# Patient Record
Sex: FEMALE | Race: BLACK OR AFRICAN AMERICAN | NOT HISPANIC OR LATINO | Employment: FULL TIME | URBAN - METROPOLITAN AREA
[De-identification: names, ages, dates, MRNs, and addresses within clinical notes are randomized per-mention and may not be internally consistent; named-entity substitution may affect disease eponyms.]

---

## 2017-04-04 ENCOUNTER — OFFICE VISIT (OUTPATIENT)
Dept: NEUROLOGY | Facility: CLINIC | Age: 57
End: 2017-04-04
Payer: COMMERCIAL

## 2017-04-04 VITALS
DIASTOLIC BLOOD PRESSURE: 85 MMHG | SYSTOLIC BLOOD PRESSURE: 128 MMHG | BODY MASS INDEX: 34.77 KG/M2 | HEART RATE: 87 BPM | WEIGHT: 188.94 LBS | HEIGHT: 62 IN

## 2017-04-04 DIAGNOSIS — G43.909 MIGRAINE WITHOUT STATUS MIGRAINOSUS, NOT INTRACTABLE, UNSPECIFIED MIGRAINE TYPE: Primary | ICD-10-CM

## 2017-04-04 DIAGNOSIS — G47.00 INSOMNIA, UNSPECIFIED TYPE: ICD-10-CM

## 2017-04-04 PROCEDURE — 1160F RVW MEDS BY RX/DR IN RCRD: CPT | Mod: S$GLB,,, | Performed by: PSYCHIATRY & NEUROLOGY

## 2017-04-04 PROCEDURE — 99999 PR PBB SHADOW E&M-EST. PATIENT-LVL III: CPT | Mod: PBBFAC,,, | Performed by: PSYCHIATRY & NEUROLOGY

## 2017-04-04 PROCEDURE — 99214 OFFICE O/P EST MOD 30 MIN: CPT | Mod: S$GLB,,, | Performed by: PSYCHIATRY & NEUROLOGY

## 2017-04-04 RX ORDER — ZOLPIDEM TARTRATE 12.5 MG/1
12.5 TABLET, FILM COATED, EXTENDED RELEASE ORAL NIGHTLY PRN
Qty: 30 TABLET | Refills: 5 | Status: SHIPPED | OUTPATIENT
Start: 2017-04-04 | End: 2017-11-01 | Stop reason: SDUPTHER

## 2017-04-04 RX ORDER — FLUTICASONE PROPIONATE 50 MCG
SPRAY, SUSPENSION (ML) NASAL
COMMUNITY
Start: 2017-01-04

## 2017-04-04 RX ORDER — VENLAFAXINE 75 MG/1
75 TABLET ORAL DAILY
Qty: 30 TABLET | Refills: 5 | Status: SHIPPED | OUTPATIENT
Start: 2017-04-04 | End: 2017-09-13

## 2017-04-04 RX ORDER — TOPIRAMATE 50 MG/1
50 TABLET, FILM COATED ORAL 2 TIMES DAILY
Qty: 60 TABLET | Refills: 5 | Status: SHIPPED | OUTPATIENT
Start: 2017-04-04 | End: 2017-09-13

## 2017-04-04 NOTE — PROGRESS NOTES
Subjective:       Patient ID: Zeina Estrella is a 56 y.o. female.    Chief Complaint:  Headache      History of Present Illness  HPI   This is a 56-year-old female who is being followed by me with complaints of intermittent headaches.  She has had a history of these headaches for many years, diagnosed as migraines.  She was last seen by me 6 months ago.  Stress was a major precipitant and she does report that ever since she stopped working in July 2008, the headache frequency and intensity had significantly improved.  She was working as a [employer] .  She took some time off and subsequently started working as a .  However she does report that his job is stressful which can be a trigger for her headaches.    The headaches are usually frontotemporal occasionally throbbing and she has been using OTC Excedrin migraine with good relief.  She is presently on topiramate 50 mg once a day and Effexor 50 mg at bedtime daily.  She is also on Paxil 10 mg daily for menopausal symptoms.  She has been sleeping well with Ambien CR.       Review of Systems  Review of Systems   Constitutional: Negative.    HENT: Negative for hearing loss.    Eyes: Negative.  Negative for visual disturbance.   Respiratory: Negative.  Negative for shortness of breath.    Cardiovascular: Negative.  Negative for chest pain and palpitations.   Gastrointestinal: Negative.    Endocrine: Negative.    Genitourinary: Negative.    Musculoskeletal: Negative.  Negative for back pain, gait problem and neck pain.   Skin: Negative.    Allergic/Immunologic: Negative.    Neurological: Positive for dizziness and headaches. Negative for tremors, seizures, syncope, speech difficulty, weakness and numbness.   Hematological: Negative.    Psychiatric/Behavioral: Positive for sleep disturbance. Negative for confusion and decreased concentration.       Objective:      Neurologic Exam     Mental Status   Oriented to  person, place, and time.   Registration: recalls 3 of 3 objects. Recall at 5 minutes: recalls 3 of 3 objects. Follows 3 step commands.   Attention: normal. Concentration: normal.   Speech: speech is normal   Level of consciousness: alert  Knowledge: good. Able to perform simple calculations.   Able to name object. Able to read. Able to repeat. Able to write. Normal comprehension.     Cranial Nerves   Cranial nerves II through XII intact.     Motor Exam   Muscle bulk: normal  Overall muscle tone: normal    Strength   Strength 5/5 throughout.     Sensory Exam   Light touch normal.   Proprioception normal.   Pinprick normal.     Gait, Coordination, and Reflexes     Gait  Gait: normal    Coordination   Romberg: negative  Finger to nose coordination: normal    Tremor   Resting tremor: absent  Intention tremor: absent  Action tremor: absent    Reflexes   Right brachioradialis: 2+  Left brachioradialis: 2+  Right biceps: 2+  Left biceps: 2+  Right triceps: 2+  Left triceps: 2+  Right patellar: 2+  Left patellar: 2+  Right achilles: 2+  Left achilles: 2+  Right plantar: normal  Left plantar: normal      Physical Exam   Constitutional: She is oriented to person, place, and time. She appears well-developed and well-nourished.   HENT:   Head: Normocephalic and atraumatic.   Neck: Normal range of motion. Neck supple. Carotid bruit is not present.   Neurological: She is oriented to person, place, and time. She has normal strength. She has a normal Finger-Nose-Finger Test and a normal Romberg Test. Gait normal.   Reflex Scores:       Tricep reflexes are 2+ on the right side and 2+ on the left side.       Bicep reflexes are 2+ on the right side and 2+ on the left side.       Brachioradialis reflexes are 2+ on the right side and 2+ on the left side.       Patellar reflexes are 2+ on the right side and 2+ on the left side.       Achilles reflexes are 2+ on the right side and 2+ on the left side.  Psychiatric: Her speech is normal.    Vitals reviewed.        Assessment:        1. Migraine without status migrainosus, not intractable, unspecified migraine type    2. Insomnia, unspecified type             Plan:       Discussed with patient.  Continue topiramate 50 mg daily.  Continue Ambien CR for sleep.  Effexor increased to 75 mg daily for the headaches that appear to be related to recent stressors. Discussed stress reduction techniques.  Follow-up in 6 months if stable.

## 2017-04-04 NOTE — PATIENT INSTRUCTIONS
Discussed with patient.  Continue topiramate 50 mg daily.  Continue Ambien CR for sleep.  Effexor increased to 75 mg daily for the headaches that appear to be related to recent stressors. Discussed stress reduction techniques.

## 2017-04-04 NOTE — MR AVS SNAPSHOT
[facility]  [address]  [place] [address]  Phone: [phone]  Fax: [phone]                  Zeina Estrella   2017 9:00 AM   Office Visit    Description:  Female : 1960   Provider:  Natalio Chacon MD   Department:  [facility]           Reason for Visit     Headache           Diagnoses this Visit        Comments    Migraine without status migrainosus, not intractable, unspecified migraine type    -  Primary     Insomnia, unspecified type                To Do List           Future Appointments        Provider Department Dept Phone    10/4/2017 9:00 AM Natalio Chacon MD [facility] [phone]      Goals (5 Years of Data)     None      Follow-Up and Disposition     Return in about 6 months (around 10/4/2017).       These Medications        Disp Refills Start End    venlafaxine (EFFEXOR) 75 MG tablet 30 tablet 5 2017 10/1/2017    Take 1 tablet (75 mg total) by mouth once daily. - Oral    [facility] [address] [facility] Ph #: [phone]       zolpidem (AMBIEN CR) 12.5 MG CR tablet 30 tablet 5 2017 10/1/2017    Take 1 tablet (12.5 mg total) by mouth nightly as needed for Insomnia. - Oral    [facility] [address] [facility] Ph #: [phone]       topiramate (TOPAMAX) 50 MG tablet 60 tablet 5 2017 10/1/2017    Take 1 tablet (50 mg total) by mouth 2 (two) times daily. - Oral    [facility] [address] [facility] Ph #: [phone]         [facility]     [place] On Call Nurse Care Line -  Assistance  Unless otherwise directed by your provider, please contact [facility], our nurse care line that is available for 24 assistance.     Registered nurses in the [facility] provide: appointment scheduling, clinical advisement, health education, and other advisory services.  Call:  "[phone] (toll free)               Medications           Message regarding Medications     Verify the changes and/or additions to your medication regime listed below are the same as discussed with your clinician today.  If any of these changes or additions are incorrect, please notify your healthcare provider.        CHANGE how you are taking these medications     Start Taking Instead of    venlafaxine (EFFEXOR) 75 MG tablet venlafaxine (EFFEXOR) 50 MG Tab    Dosage:  Take 1 tablet (75 mg total) by mouth once daily. Dosage:  Take 1 tablet (50 mg total) by mouth once daily.    Reason for Change:  Reorder     zolpidem (AMBIEN CR) 12.5 MG CR tablet zolpidem (AMBIEN CR) 12.5 MG CR tablet    Dosage:  Take 1 tablet (12.5 mg total) by mouth nightly as needed for Insomnia.     Reason for Change:  Reorder            Verify that the below list of medications is an accurate representation of the medications you are currently taking.  If none reported, the list may be blank. If incorrect, please contact your healthcare provider. Carry this list with you in case of emergency.           Current Medications     fluconazole (DIFLUCAN) 150 MG Tab     fluticasone (FLONASE) 50 mcg/actuation nasal spray     levothyroxine (SYNTHROID) 50 MCG tablet     paroxetine (PAXIL) 10 MG tablet Take 10 mg by mouth every morning.    terconazole (TERAZOL 3) 0.8 % vaginal cream     topiramate (TOPAMAX) 50 MG tablet Take 1 tablet (50 mg total) by mouth 2 (two) times daily.    venlafaxine (EFFEXOR) 75 MG tablet Take 1 tablet (75 mg total) by mouth once daily.    VITAMIN D2 50,000 unit capsule     zolpidem (AMBIEN CR) 12.5 MG CR tablet Take 1 tablet (12.5 mg total) by mouth nightly as needed for Insomnia.           Clinical Reference Information           Your Vitals Were     BP Pulse Height Weight BMI    128/85 (BP Location: Right arm, Patient Position: Sitting, BP Method: Automatic) 87 5' 2" (1.575 m) 85.7 kg (188 lb 15 oz) 34.56 kg/m2      Blood " Pressure          Most Recent Value    BP  128/85      Allergies as of 4/4/2017     Erythromycin      Immunizations Administered on Date of Encounter - 4/4/2017     None      [facility] Sign-Up     Activating your [facility] account is as easy as 1-2-3!     1) Visit [email or web address], select Sign Up Now, enter this activation code and your date of birth, then select Next.  [email or web address]  Expires: 5/19/2017 10:15 AM      2) Create a username and password to use when you visit [facility] in the future and select a security question in case you lose your password and select Next.    3) Enter your e-mail address and click Sign Up!    Additional Information  If you have questions, please e-mail [email or web address] or call [phone] to talk to our [facility] staff. Remember, [facility] is NOT to be used for urgent needs. For medical emergencies, dial 911.         Instructions    Discussed with patient.  Continue topiramate 50 mg daily.  Continue Ambien CR for sleep.  Effexor increased to 75 mg daily for the headaches that appear to be related to recent stressors. Discussed stress reduction techniques.       Language Assistance Services     ATTENTION: Language assistance services are available, free of charge. Please call [phone].      ATENCIÓN: Si habla español, tiene a adams disposición servicios gratuitos de asistencia lingüística. Llame al [phone].     [place] Ý: N?u b?n nói Ti?ng Vi?t, có các d?ch v? h? tr? ngôn ng? mi?n phí dành cho b?n. G?i s? [phone].         [facility] complies with applicable Federal civil rights laws and does not discriminate on the basis of race, color, national origin, age, disability, or sex.

## 2017-09-07 ENCOUNTER — TELEPHONE (OUTPATIENT)
Dept: NEUROLOGY | Facility: CLINIC | Age: 57
End: 2017-09-07

## 2017-09-07 NOTE — TELEPHONE ENCOUNTER
----- Message from Lesley Johnson sent at 9/7/2017 10:04 AM CDT -----  Contact: Patient [phone]  Patient is calling to request an order for and MRI, patient states she has been having a lot of head pain. Please call

## 2017-09-12 ENCOUNTER — TELEPHONE (OUTPATIENT)
Dept: NEUROLOGY | Facility: CLINIC | Age: 57
End: 2017-09-12

## 2017-09-12 NOTE — TELEPHONE ENCOUNTER
----- Message from Lucina Mcnamara sent at 9/12/2017  9:06 AM CDT -----  Contact: pt  _x  1st Request  _  2nd Request  _  3rd Request      Who:pt     Why: returning call back    What Number to Call Back: [phone]    When to Expect a call back: (Before the end of the day)   -- if call after 3:00 call back will be tomorrow.

## 2017-09-12 NOTE — TELEPHONE ENCOUNTER
Spoke to patient who's appointment from today has been rescheduled to tomorrow due to deposition  has to attend.

## 2017-09-13 ENCOUNTER — OFFICE VISIT (OUTPATIENT)
Dept: NEUROLOGY | Facility: CLINIC | Age: 57
End: 2017-09-13
Payer: COMMERCIAL

## 2017-09-13 VITALS
HEIGHT: 62 IN | SYSTOLIC BLOOD PRESSURE: 124 MMHG | WEIGHT: 195.13 LBS | HEART RATE: 83 BPM | DIASTOLIC BLOOD PRESSURE: 88 MMHG | BODY MASS INDEX: 35.91 KG/M2

## 2017-09-13 DIAGNOSIS — G43.919 INTRACTABLE MIGRAINE WITHOUT STATUS MIGRAINOSUS, UNSPECIFIED MIGRAINE TYPE: Primary | ICD-10-CM

## 2017-09-13 DIAGNOSIS — G47.00 INSOMNIA, UNSPECIFIED TYPE: ICD-10-CM

## 2017-09-13 PROCEDURE — 99999 PR PBB SHADOW E&M-EST. PATIENT-LVL III: CPT | Mod: PBBFAC,,, | Performed by: PSYCHIATRY & NEUROLOGY

## 2017-09-13 PROCEDURE — 3008F BODY MASS INDEX DOCD: CPT | Mod: S$GLB,,, | Performed by: PSYCHIATRY & NEUROLOGY

## 2017-09-13 PROCEDURE — 99214 OFFICE O/P EST MOD 30 MIN: CPT | Mod: S$GLB,,, | Performed by: PSYCHIATRY & NEUROLOGY

## 2017-09-13 RX ORDER — VENLAFAXINE HYDROCHLORIDE 150 MG/1
150 CAPSULE, EXTENDED RELEASE ORAL DAILY
Qty: 30 CAPSULE | Refills: 5 | Status: SHIPPED | OUTPATIENT
Start: 2017-09-13 | End: 2018-03-12

## 2017-09-13 NOTE — PROGRESS NOTES
Subjective:       Patient ID: Zeina Estrella is a 57 y.o. female.    Chief Complaint:  Headache      History of Present Illness  HPI   This is a 57-year-old female who is being followed by me with complaints of intermittent headaches.  She has had a history of these headaches for many years, diagnosed as migraines.  She was last seen by me 6 months ago.  Stress was a major precipitant and she does report that ever since she stopped working in July 2008, the headache frequency and intensity had significantly improved.  She was working as a [employer] .  She took some time off and subsequently started working as a .  However she does report that his job is stressful which can be a trigger for her headaches.    The headaches are usually frontotemporal occasionally throbbing and she has been using OTC Excedrin migraine with good relief.  She does report that over the past couple months she has had almost daily headaches with fluctuating intensity primarily left-sided somewhat unusual.  They are not be responding to the OTC NSAIDs for headache medication.  She increased the topiramate 50 mg twice a day which has not helped.  She is also on Effexor 75 mg at bedtime daily.  She is also on Paxil 10 mg daily for menopausal symptoms.  She has been sleeping well with Ambien CR.       Review of Systems  Review of Systems   Constitutional: Negative.    HENT: Negative for hearing loss.    Eyes: Negative.  Negative for visual disturbance.   Respiratory: Negative.  Negative for shortness of breath.    Cardiovascular: Negative.  Negative for chest pain and palpitations.   Gastrointestinal: Negative.    Endocrine: Negative.    Genitourinary: Negative.    Musculoskeletal: Negative.  Negative for back pain, gait problem and neck pain.   Skin: Negative.    Allergic/Immunologic: Negative.    Neurological: Positive for dizziness and headaches. Negative for tremors, seizures,  syncope, speech difficulty, weakness and numbness.   Hematological: Negative.    Psychiatric/Behavioral: Positive for sleep disturbance. Negative for confusion and decreased concentration.       Objective:      Neurologic Exam     Mental Status   Oriented to person, place, and time.   Registration: recalls 3 of 3 objects. Recall at 5 minutes: recalls 3 of 3 objects. Follows 3 step commands.   Attention: normal. Concentration: normal.   Speech: speech is normal   Level of consciousness: alert  Knowledge: good. Able to perform simple calculations.   Able to name object. Able to read. Able to repeat. Able to write. Normal comprehension.     Cranial Nerves   Cranial nerves II through XII intact.     Motor Exam   Muscle bulk: normal  Overall muscle tone: normal    Strength   Strength 5/5 throughout.     Sensory Exam   Light touch normal.   Proprioception normal.   Pinprick normal.     Gait, Coordination, and Reflexes     Gait  Gait: normal    Coordination   Romberg: negative  Finger to nose coordination: normal    Tremor   Resting tremor: absent  Intention tremor: absent  Action tremor: absent    Reflexes   Right brachioradialis: 2+  Left brachioradialis: 2+  Right biceps: 2+  Left biceps: 2+  Right triceps: 2+  Left triceps: 2+  Right patellar: 2+  Left patellar: 2+  Right achilles: 2+  Left achilles: 2+  Right plantar: normal  Left plantar: normal      Physical Exam   Constitutional: She is oriented to person, place, and time. She appears well-developed and well-nourished.   HENT:   Head: Normocephalic and atraumatic.   Neck: Normal range of motion. Neck supple. Carotid bruit is not present.   Neurological: She is oriented to person, place, and time. She has normal strength. She has a normal Finger-Nose-Finger Test and a normal Romberg Test. Gait normal.   Reflex Scores:       Tricep reflexes are 2+ on the right side and 2+ on the left side.       Bicep reflexes are 2+ on the right side and 2+ on the left side.        Brachioradialis reflexes are 2+ on the right side and 2+ on the left side.       Patellar reflexes are 2+ on the right side and 2+ on the left side.       Achilles reflexes are 2+ on the right side and 2+ on the left side.  Psychiatric: Her speech is normal.   Vitals reviewed.        Assessment:        1. Intractable migraine without status migrainosus, unspecified migraine type    2. Insomnia, unspecified type             Plan:       Discussed with patient.  Because of the increase in her headaches and the unilateral nature of her headaches will get an MRI scan of the brain, noncontrast.  She has not had any neuroradiological studies in the past .  Gradually taper off the topiramate and increase the Effexor to 150 mg daily.  Continue Ambien CR for sleep.  Follow-up in 3 months if stable.

## 2017-09-13 NOTE — PATIENT INSTRUCTIONS
Discussed with patient.  Because of the increase in her headaches and the unilateral nature of her headaches will get an MRI scan of the brain, noncontrast.  She has not had any neuroradiological studies in the past .  Gradually taper off the topiramate and increase the Effexor to 150 mg daily.  Continue Ambien CR for sleep.

## 2017-09-20 ENCOUNTER — HOSPITAL ENCOUNTER (OUTPATIENT)
Dept: RADIOLOGY | Facility: OTHER | Age: 57
Discharge: HOME | End: 2017-09-20
Attending: PSYCHIATRY & NEUROLOGY
Payer: COMMERCIAL

## 2017-09-20 ENCOUNTER — PATIENT MESSAGE (OUTPATIENT)
Dept: NEUROLOGY | Facility: CLINIC | Age: 57
End: 2017-09-20

## 2017-09-20 DIAGNOSIS — G43.919 INTRACTABLE MIGRAINE WITHOUT STATUS MIGRAINOSUS, UNSPECIFIED MIGRAINE TYPE: ICD-10-CM

## 2017-09-20 PROCEDURE — 70551 MRI BRAIN STEM W/O DYE: CPT | Mod: TC

## 2017-09-20 PROCEDURE — 70551 MRI BRAIN STEM W/O DYE: CPT | Mod: 26,,, | Performed by: INTERNAL MEDICINE

## 2017-11-01 DIAGNOSIS — G47.00 INSOMNIA, UNSPECIFIED TYPE: ICD-10-CM

## 2017-11-01 RX ORDER — ZOLPIDEM TARTRATE 12.5 MG/1
12.5 TABLET, FILM COATED, EXTENDED RELEASE ORAL NIGHTLY PRN
Qty: 30 TABLET | Refills: 5 | Status: SHIPPED | OUTPATIENT
Start: 2017-11-01 | End: 2019-09-03

## 2017-11-13 ENCOUNTER — PATIENT MESSAGE (OUTPATIENT)
Dept: NEUROLOGY | Facility: CLINIC | Age: 57
End: 2017-11-13

## 2017-12-13 ENCOUNTER — OFFICE VISIT (OUTPATIENT)
Dept: NEUROLOGY | Facility: CLINIC | Age: 57
End: 2017-12-13
Payer: COMMERCIAL

## 2017-12-13 VITALS
SYSTOLIC BLOOD PRESSURE: 123 MMHG | DIASTOLIC BLOOD PRESSURE: 82 MMHG | WEIGHT: 196.63 LBS | HEART RATE: 99 BPM | HEIGHT: 62 IN | BODY MASS INDEX: 36.18 KG/M2

## 2017-12-13 DIAGNOSIS — G50.8 TRIGEMINAL NEURITIS: ICD-10-CM

## 2017-12-13 DIAGNOSIS — G43.009 MIGRAINE WITHOUT AURA AND WITHOUT STATUS MIGRAINOSUS, NOT INTRACTABLE: Primary | ICD-10-CM

## 2017-12-13 DIAGNOSIS — F41.8 DEPRESSION WITH ANXIETY: ICD-10-CM

## 2017-12-13 PROCEDURE — 99214 OFFICE O/P EST MOD 30 MIN: CPT | Mod: S$GLB,,, | Performed by: PSYCHIATRY & NEUROLOGY

## 2017-12-13 PROCEDURE — 99999 PR PBB SHADOW E&M-EST. PATIENT-LVL III: CPT | Mod: PBBFAC,,, | Performed by: PSYCHIATRY & NEUROLOGY

## 2017-12-13 RX ORDER — NAPROXEN 500 MG/1
TABLET ORAL
COMMUNITY
Start: 2017-10-03 | End: 2018-06-21

## 2017-12-13 NOTE — PROGRESS NOTES
Subjective:       Patient ID: Zeina Estrella is a 57 y.o. female.    Chief Complaint:  Headache      History of Present Illness  HPI   This is a 57-year-old female who is being followed by me with complaints of intermittent headaches.  She has had a history of these headaches for many years, diagnosed as migraines.  She was last seen by me 6 months ago.  Stress was a major precipitant and she does report that ever since she stopped working in July 2008, the headache frequency and intensity had significantly improved.  She was working as a [employer] .  She took some time off and subsequently started working as a .  However she does report that her job is stressful which can be a trigger for her headaches.  An MRI scan of the brain done was normal.    The headaches are usually frontotemporal occasionally throbbing and she has been using OTC Excedrin migraine with good relief.  She does report that over the past couple months she has had almost daily headaches with fluctuating intensity primarily left-sided somewhat unusual.  She is presently off the Topamax.  The Effexor dosage was increased to 150 mg at bedtime which has helped the intensity of the headache however she does have occasional left-sided headache that appears to be triggered by the cold weather.  She is also on Paxil 10 mg daily for menopausal symptoms.  She has been sleeping well with Ambien QUENTIN.       Review of Systems  Review of Systems   Constitutional: Negative.    HENT: Negative for hearing loss.    Eyes: Negative.  Negative for visual disturbance.   Respiratory: Negative.  Negative for shortness of breath.    Cardiovascular: Negative.  Negative for chest pain and palpitations.   Gastrointestinal: Negative.    Endocrine: Negative.    Genitourinary: Negative.    Musculoskeletal: Negative.  Negative for back pain, gait problem and neck pain.   Skin: Negative.    Allergic/Immunologic: Negative.     Neurological: Positive for dizziness and headaches. Negative for tremors, seizures, syncope, speech difficulty, weakness and numbness.   Hematological: Negative.    Psychiatric/Behavioral: Positive for sleep disturbance. Negative for confusion and decreased concentration.       Objective:      Neurologic Exam     Mental Status   Oriented to person, place, and time.   Registration: recalls 3 of 3 objects. Recall at 5 minutes: recalls 3 of 3 objects. Follows 3 step commands.   Attention: normal. Concentration: normal.   Speech: speech is normal   Level of consciousness: alert  Knowledge: good. Able to perform simple calculations.   Able to name object. Able to read. Able to repeat. Able to write. Normal comprehension.     Cranial Nerves   Cranial nerves II through XII intact.     Motor Exam   Muscle bulk: normal  Overall muscle tone: normal    Strength   Strength 5/5 throughout.     Sensory Exam   Light touch normal.   Proprioception normal.   Pinprick normal.     Gait, Coordination, and Reflexes     Gait  Gait: normal    Coordination   Romberg: negative  Finger to nose coordination: normal    Tremor   Resting tremor: absent  Intention tremor: absent  Action tremor: absent    Reflexes   Right brachioradialis: 2+  Left brachioradialis: 2+  Right biceps: 2+  Left biceps: 2+  Right triceps: 2+  Left triceps: 2+  Right patellar: 2+  Left patellar: 2+  Right achilles: 2+  Left achilles: 2+  Right plantar: normal  Left plantar: normal      Physical Exam   Constitutional: She is oriented to person, place, and time. She appears well-developed and well-nourished.   HENT:   Head: Normocephalic and atraumatic.   Neck: Normal range of motion. Neck supple. Carotid bruit is not present.   Neurological: She is oriented to person, place, and time. She has normal strength. She has a normal Finger-Nose-Finger Test and a normal Romberg Test. Gait normal.   Reflex Scores:       Tricep reflexes are 2+ on the right side and 2+ on the  left side.       Bicep reflexes are 2+ on the right side and 2+ on the left side.       Brachioradialis reflexes are 2+ on the right side and 2+ on the left side.       Patellar reflexes are 2+ on the right side and 2+ on the left side.       Achilles reflexes are 2+ on the right side and 2+ on the left side.  Psychiatric: Her speech is normal.   Vitals reviewed.        Assessment:        1. Migraine without aura and without status migrainosus, not intractable    2. Trigeminal neuritis    3. Depression with anxiety             Plan:       Discussed with patient.  The left-sided facial symptoms may represent trigeminal neuritis.  Continue the Effexor 150 mg daily.  Advised magnesium oxide OTC supplementation or 100 mg at bedtime daily.  She will contact me if symptoms continue.  Continue Ambien CR for sleep.  Follow-up in 6 months if stable.

## 2017-12-13 NOTE — PATIENT INSTRUCTIONS
Discussed with patient.  Continue the Effexor 150 mg daily.  Advised magnesium oxide OTC supplementation or 100 mg at bedtime daily.  She will contact me if symptoms continue.  Continue Ambien CR for sleep.

## 2018-03-12 RX ORDER — VENLAFAXINE 75 MG/1
TABLET ORAL
Qty: 30 TABLET | Refills: 5 | Status: SHIPPED | OUTPATIENT
Start: 2018-03-12 | End: 2018-06-21

## 2018-03-12 NOTE — TELEPHONE ENCOUNTER
----- Message from Shruthi Pena sent at 3/12/2018  4:00 PM CDT -----  Contact: suraj ([facility])  x_  1st Request  _  2nd Request  _  3rd Request    Please refill the medication listed below. Please call the patient when the prescription is ready for  at this phone number:     [phone]      Medication #1venlafaxine (EFFEXOR-XR) 150 MG Cp24    Preferred Pharmacy:[facility], [place] [address] [address]

## 2018-03-15 ENCOUNTER — PATIENT MESSAGE (OUTPATIENT)
Dept: NEUROLOGY | Facility: CLINIC | Age: 58
End: 2018-03-15

## 2018-03-16 RX ORDER — VENLAFAXINE HYDROCHLORIDE 150 MG/1
CAPSULE, EXTENDED RELEASE ORAL
Qty: 30 CAPSULE | Refills: 5 | Status: SHIPPED | OUTPATIENT
Start: 2018-03-16 | End: 2019-09-05

## 2018-03-16 NOTE — TELEPHONE ENCOUNTER
----- Message from Shayla Ward sent at 3/16/2018  2:55 PM CDT -----  Contact: Erin with [facility]  X  1st Request  _  2nd Request  _  3rd Request        Who: Erin with [facility]    Why: Requesting a call back in regards to a prescription for venlafaxine (EFFEXOR) 75 MG tablet. Erin states the pt states the dosage is incorrect. Erin states she needs a new prescription with the updated dosage.    Please return the call at earliest convenience. Thanks!    What Number to Call Back: [phone]      When to Expect a call back: (Within 24 hours)

## 2018-03-16 NOTE — TELEPHONE ENCOUNTER
----- Message from Sachin Johnson sent at 3/16/2018  2:58 PM CDT -----  Contact: Patient @ [phone]  Patient is calling to get an update on the refill request of (venlafaxine (EFFEXOR) 150 MG tablet ), pls call

## 2018-03-26 ENCOUNTER — PATIENT MESSAGE (OUTPATIENT)
Dept: NEUROLOGY | Facility: CLINIC | Age: 58
End: 2018-03-26

## 2018-03-27 ENCOUNTER — PATIENT MESSAGE (OUTPATIENT)
Dept: NEUROLOGY | Facility: CLINIC | Age: 58
End: 2018-03-27

## 2018-06-13 ENCOUNTER — TELEPHONE (OUTPATIENT)
Dept: NEUROLOGY | Facility: CLINIC | Age: 58
End: 2018-06-13

## 2018-06-21 ENCOUNTER — OFFICE VISIT (OUTPATIENT)
Dept: NEUROLOGY | Facility: CLINIC | Age: 58
End: 2018-06-21
Payer: COMMERCIAL

## 2018-06-21 VITALS
SYSTOLIC BLOOD PRESSURE: 132 MMHG | HEIGHT: 62 IN | DIASTOLIC BLOOD PRESSURE: 94 MMHG | WEIGHT: 191.56 LBS | BODY MASS INDEX: 35.25 KG/M2 | HEART RATE: 97 BPM

## 2018-06-21 DIAGNOSIS — G43.009 MIGRAINE WITHOUT AURA AND WITHOUT STATUS MIGRAINOSUS, NOT INTRACTABLE: Primary | ICD-10-CM

## 2018-06-21 DIAGNOSIS — F41.8 DEPRESSION WITH ANXIETY: ICD-10-CM

## 2018-06-21 DIAGNOSIS — G47.00 INSOMNIA, UNSPECIFIED TYPE: ICD-10-CM

## 2018-06-21 DIAGNOSIS — G50.8 TRIGEMINAL NEURITIS: ICD-10-CM

## 2018-06-21 DIAGNOSIS — E03.9 HYPOTHYROIDISM, UNSPECIFIED TYPE: ICD-10-CM

## 2018-06-21 PROCEDURE — 99214 OFFICE O/P EST MOD 30 MIN: CPT | Mod: S$GLB,,, | Performed by: PSYCHIATRY & NEUROLOGY

## 2018-06-21 PROCEDURE — 99999 PR PBB SHADOW E&M-EST. PATIENT-LVL III: CPT | Mod: PBBFAC,,, | Performed by: PSYCHIATRY & NEUROLOGY

## 2018-06-21 PROCEDURE — 3008F BODY MASS INDEX DOCD: CPT | Mod: CPTII,S$GLB,, | Performed by: PSYCHIATRY & NEUROLOGY

## 2018-06-21 RX ORDER — HEPATITIS B VACCINE (RECOMBINANT) 20 UG/ML
INJECTION, SUSPENSION INTRAMUSCULAR
Refills: 0 | COMMUNITY
Start: 2018-05-24

## 2018-06-21 RX ORDER — THYROID, PORCINE 30 MG/1
30 TABLET ORAL
COMMUNITY
Start: 2018-06-05 | End: 2018-12-18

## 2018-06-21 RX ORDER — SALMONELLA TYPHI TY2 VI POLYSACCHARIDE ANTIGEN 25 UG/.5ML
INJECTION, SOLUTION INTRAMUSCULAR
Refills: 0 | COMMUNITY
Start: 2018-05-25

## 2018-06-21 NOTE — PROGRESS NOTES
Subjective:       Patient ID: Zeina Estrella is a 58 y.o. female.    Chief Complaint:  Headache      History of Present Illness  HPI   This is a 58-year-old female who is being followed by me with complaints of intermittent headaches.  She has had a history of these headaches for many years, diagnosed as migraines.  She was last seen by me 6 months ago.  Stress was a major precipitant and she does report that ever since she stopped working in July 2008, the headache frequency and intensity had significantly improved.  She was working as a [employer] .  She took some time off and subsequently started working as a .  However she does report that her job is  A littlestressful which can be a trigger for her headaches.  An MRI scan of the brain done was normal.    The headaches are usually frontotemporal occasionally throbbing and she has been using OTC Excedrin migraine with good relief.  Since she has been on the Effexor 150 mg daily she reports significant improvement in headache.  And in addition she is sleeping a little better and has been using Ambien only on an as-needed basis.          Review of Systems  Review of Systems   Constitutional: Negative.    HENT: Negative for hearing loss.    Eyes: Negative.  Negative for visual disturbance.   Respiratory: Negative.  Negative for shortness of breath.    Cardiovascular: Negative.  Negative for chest pain and palpitations.   Gastrointestinal: Negative.    Endocrine: Negative.    Genitourinary: Negative.    Musculoskeletal: Negative.  Negative for back pain, gait problem and neck pain.   Skin: Negative.    Allergic/Immunologic: Negative.    Neurological: Positive for dizziness and headaches. Negative for tremors, seizures, syncope, speech difficulty, weakness and numbness.   Hematological: Negative.    Psychiatric/Behavioral: Positive for sleep disturbance. Negative for confusion and decreased concentration.        Objective:      Neurologic Exam     Mental Status   Oriented to person, place, and time.   Registration: recalls 3 of 3 objects. Recall at 5 minutes: recalls 3 of 3 objects. Follows 3 step commands.   Attention: normal. Concentration: normal.   Speech: speech is normal   Level of consciousness: alert  Knowledge: good. Able to perform simple calculations.   Able to name object. Able to read. Able to repeat. Able to write. Normal comprehension.     Cranial Nerves   Cranial nerves II through XII intact.     Motor Exam   Muscle bulk: normal  Overall muscle tone: normal    Strength   Strength 5/5 throughout.     Sensory Exam   Light touch normal.   Proprioception normal.   Pinprick normal.     Gait, Coordination, and Reflexes     Gait  Gait: normal    Coordination   Romberg: negative  Finger to nose coordination: normal    Tremor   Resting tremor: absent  Intention tremor: absent  Action tremor: absent    Reflexes   Right brachioradialis: 2+  Left brachioradialis: 2+  Right biceps: 2+  Left biceps: 2+  Right triceps: 2+  Left triceps: 2+  Right patellar: 2+  Left patellar: 2+  Right achilles: 2+  Left achilles: 2+  Right plantar: normal  Left plantar: normal      Physical Exam   Constitutional: She is oriented to person, place, and time. She appears well-developed and well-nourished.   HENT:   Head: Normocephalic and atraumatic.   Neck: Normal range of motion. Neck supple. Carotid bruit is not present.   Neurological: She is oriented to person, place, and time. She has normal strength. She has a normal Finger-Nose-Finger Test and a normal Romberg Test. Gait normal.   Reflex Scores:       Tricep reflexes are 2+ on the right side and 2+ on the left side.       Bicep reflexes are 2+ on the right side and 2+ on the left side.       Brachioradialis reflexes are 2+ on the right side and 2+ on the left side.       Patellar reflexes are 2+ on the right side and 2+ on the left side.       Achilles reflexes are 2+ on the right  side and 2+ on the left side.  Psychiatric: Her speech is normal.   Vitals reviewed.        Assessment:        1. Migraine without aura and without status migrainosus, not intractable    2. Trigeminal neuritis    3. Insomnia, unspecified type    4. Depression with anxiety    5. Hypothyroidism, unspecified type             Plan:       Discussed with patient.  Continue the Effexor 150 mg daily.  Advised magnesium oxide OTC supplementation or 100 mg at bedtime daily.  She will contact me if symptoms continue.  Continue Ambien CR for sleep as needed.  Follow-up in 6 months if stable.

## 2018-06-21 NOTE — PATIENT INSTRUCTIONS
Discussed with patient.  Continue the Effexor 150 mg daily.  Advised magnesium oxide OTC supplementation or 100 mg at bedtime daily.  She will contact me if symptoms continue.  Continue Ambien CR for sleep as needed.

## 2018-11-05 ENCOUNTER — OFFICE VISIT (OUTPATIENT)
Dept: URGENT CARE | Facility: CLINIC | Age: 58
End: 2018-11-05
Payer: COMMERCIAL

## 2018-11-05 VITALS
HEART RATE: 77 BPM | BODY MASS INDEX: 35.15 KG/M2 | DIASTOLIC BLOOD PRESSURE: 95 MMHG | TEMPERATURE: 98 F | HEIGHT: 62 IN | OXYGEN SATURATION: 98 % | RESPIRATION RATE: 16 BRPM | SYSTOLIC BLOOD PRESSURE: 142 MMHG | WEIGHT: 191 LBS

## 2018-11-05 DIAGNOSIS — J45.909 REACTIVE AIRWAY DISEASE WITHOUT COMPLICATION, UNSPECIFIED ASTHMA SEVERITY, UNSPECIFIED WHETHER PERSISTENT: ICD-10-CM

## 2018-11-05 DIAGNOSIS — J40 BRONCHITIS: Primary | ICD-10-CM

## 2018-11-05 PROCEDURE — 96372 THER/PROPH/DIAG INJ SC/IM: CPT | Mod: 59,S$GLB,, | Performed by: FAMILY MEDICINE

## 2018-11-05 PROCEDURE — 99203 OFFICE O/P NEW LOW 30 MIN: CPT | Mod: 25,S$GLB,, | Performed by: FAMILY MEDICINE

## 2018-11-05 PROCEDURE — 94640 AIRWAY INHALATION TREATMENT: CPT | Mod: 59,S$GLB,, | Performed by: FAMILY MEDICINE

## 2018-11-05 PROCEDURE — 3008F BODY MASS INDEX DOCD: CPT | Mod: CPTII,S$GLB,, | Performed by: FAMILY MEDICINE

## 2018-11-05 RX ORDER — BETAMETHASONE SODIUM PHOSPHATE AND BETAMETHASONE ACETATE 3; 3 MG/ML; MG/ML
6 INJECTION, SUSPENSION INTRA-ARTICULAR; INTRALESIONAL; INTRAMUSCULAR; SOFT TISSUE ONCE
Status: COMPLETED | OUTPATIENT
Start: 2018-11-05 | End: 2018-11-05

## 2018-11-05 RX ORDER — ALBUTEROL SULFATE 0.83 MG/ML
2.5 SOLUTION RESPIRATORY (INHALATION)
Status: COMPLETED | OUTPATIENT
Start: 2018-11-05 | End: 2018-11-05

## 2018-11-05 RX ORDER — ALBUTEROL SULFATE 90 UG/1
2 AEROSOL, METERED RESPIRATORY (INHALATION) EVERY 4 HOURS PRN
Qty: 18 G | Refills: 1 | Status: SHIPPED | OUTPATIENT
Start: 2018-11-05 | End: 2019-11-05

## 2018-11-05 RX ADMIN — BETAMETHASONE SODIUM PHOSPHATE AND BETAMETHASONE ACETATE 6 MG: 3; 3 INJECTION, SUSPENSION INTRA-ARTICULAR; INTRALESIONAL; INTRAMUSCULAR; SOFT TISSUE at 01:11

## 2018-11-05 RX ADMIN — ALBUTEROL SULFATE 2.5 MG: 0.83 SOLUTION RESPIRATORY (INHALATION) at 12:11

## 2018-11-05 NOTE — PATIENT INSTRUCTIONS
Bronchospasm (Adult)    Bronchospasm occurs when the airways (bronchial tubes) go into spasm and contract. This makes it hard to breathe and causes wheezing (a high-pitched whistling sound). Bronchospasm can also cause frequent coughing without wheezing.  Bronchospasm is due to irritation, inflammation, or allergic reaction of the airways. People with asthma get bronchospasm. However, not everyone with bronchospasm has asthma.  Being exposed to harmful fumes, a recent case of bronchitis, exercise, or a flare-up of chronic obstructive pulmonary disease (COPD) may cause the airways to spasm. An episode of bronchospasm may last 7 to 14 days. Medicine may be prescribed to relax the airways and prevent wheezing. Antibiotics will be prescribed only if your healthcare provider thinks there is a bacterial infection. Antibiotics do not help a viral infection.  Home care  · Drink lots of water or other fluids (at least 10 glasses a day) during an attack. This will loosen lung secretions and make it easier to breathe. If you have heart or kidney disease, check with your doctor before you drink extra fluids.  · Take prescribed medicine exactly at the times advised. If you take an inhaled medicine to help with breathing, do not use it more than once every 4 hours, unless told to do so. If prescribed an antibiotic or prednisone, take all of the medicine, even if you are feeling better after a few days.  · Do not smoke. Also avoid being exposed to secondhand smoke.  · If you were given an inhaler, use it exactly as directed. If you need to use it more often than prescribed, your condition may be getting worse. Contact your healthcare provider.  Follow-up care  Follow up with your healthcare provider, or as advised.  Note: If you are age 65 or older, have a chronic lung disease or condition that affects your immune system, or you smoke, we recommend getting pneumococcal vaccinations, as well as an influenza vaccination (flu shot)  every autumn. Ask your healthcare provider about this.  When to seek medical advice  Call your healthcare provider right away if any of these occur:  · You need to use your inhalers more often than usual.  · You develop a fever of 100.4°F (38°C) or higher.  · You are coughing up lots of dark-colored sputum (mucus).  · You do not start to improve within 24 hours.  Call 911, or get immediate medical care  Contact emergency services if any of these occur:  · Coughing up bloody sputum (mucus)  · Chest pain with each breath  · Increased wheezing or shortness of breath  Date Last Reviewed: 9/13/2015 © 2000-2017 [employer]. [address] [place] [address], [place] [address]. All rights reserved. This information is not intended as a substitute for professional medical care. Always follow your healthcare professional's instructions.      IF WHEN THINGS ARE WELL, YOU ARE NEEDING TO USE YOUR RESCUE INHALER MORE THAN TWICE IN 1 WEEK, YOU WILL NEED TO SEE YOUR PCP ABOUT BEING ON A PREVENTOR INHALER.      Make sure that you follow up with your primary care doctor in the next 2-5 days if needed .  Return to the Urgent Care if signs or symptoms change and certainly if you have worsening symptoms go to the nearest emergency department for further evaluation.

## 2018-11-05 NOTE — PROGRESS NOTES
"Subjective:       Patient ID: Zeina Estrella is a 58 y.o. female.    Vitals:    11/05/18 1237 11/05/18 1305   BP: (!) 142/95    Pulse: 76 77   Resp: 16    Temp: 98.1 °F (36.7 °C)    SpO2: 97% 98%   Weight: 86.6 kg (191 lb)    Height: 5' 2" (1.575 m)        Chief Complaint: Cough    Patient reports dry cough for 3 months. Lifetime nonsmoker.  Hx of asthma,although not using inhalers now. Never has been or has needed a "preventer". No colored sputum.      Cough   This is a recurrent problem. Episode onset: 3 months. The problem has been gradually worsening. The cough is non-productive. Associated symptoms include ear pain (both ears), headaches and rhinorrhea. Pertinent negatives include no chest pain, chills, eye redness, fever, myalgias, nasal congestion, sore throat, shortness of breath or wheezing. The symptoms are aggravated by lying down. Treatments tried: Mucinex DM. The treatment provided no relief. Her past medical history is significant for asthma. There is no history of bronchitis.     Review of Systems   Constitution: Negative for chills, fever and malaise/fatigue.   HENT: Positive for ear pain (both ears) and rhinorrhea. Negative for congestion, hoarse voice and sore throat.    Eyes: Negative for discharge and redness.   Cardiovascular: Negative for chest pain, dyspnea on exertion and leg swelling.   Respiratory: Positive for cough (Dry). Negative for shortness of breath, sputum production and wheezing.    Musculoskeletal: Negative for myalgias.   Gastrointestinal: Negative for abdominal pain and nausea.   Neurological: Positive for headaches.       Objective:      Physical Exam   Constitutional: She is oriented to person, place, and time. She appears well-developed and well-nourished. She is cooperative.  Non-toxic appearance. She does not appear ill. No distress.   HENT:   Head: Normocephalic and atraumatic.   Right Ear: Hearing, tympanic membrane, external ear and ear canal normal.   Left Ear: " Hearing, tympanic membrane, external ear and ear canal normal.   Nose: Nose normal. No mucosal edema, rhinorrhea or nasal deformity. No epistaxis. Right sinus exhibits no maxillary sinus tenderness and no frontal sinus tenderness. Left sinus exhibits no maxillary sinus tenderness and no frontal sinus tenderness.   Mouth/Throat: Uvula is midline, oropharynx is clear and moist and mucous membranes are normal. No trismus in the jaw. Normal dentition. No uvula swelling. No oropharyngeal exudate or posterior oropharyngeal erythema.   Eyes: Conjunctivae, EOM and lids are normal. Pupils are equal, round, and reactive to light. No scleral icterus.   Sclera clear bilat   Neck: Trachea normal, normal range of motion, full passive range of motion without pain and phonation normal. Neck supple.   Cardiovascular: Normal rate, regular rhythm, normal heart sounds, intact distal pulses and normal pulses.   Pulmonary/Chest: Effort normal. No stridor. No respiratory distress. She has wheezes. She has no rales.   Initially with expiratory wheezing that completely cleared after albuteral neb. No rales   Abdominal: Soft. Normal appearance and bowel sounds are normal. She exhibits no distension. There is no tenderness.   Musculoskeletal: Normal range of motion. She exhibits no edema or deformity.   Lymphadenopathy:     She has no cervical adenopathy.   Neurological: She is alert and oriented to person, place, and time. She exhibits normal muscle tone. Coordination normal.   Skin: Skin is warm, dry and intact. She is not diaphoretic. No pallor.   Psychiatric: She has a normal mood and affect. Her speech is normal and behavior is normal. Judgment and thought content normal. Cognition and memory are normal.   Nursing note and vitals reviewed.      Assessment:       1. Bronchitis    2. Reactive airway disease without complication, unspecified asthma severity, unspecified whether persistent        Plan:       Zeina was seen today for  cough.    Diagnoses and all orders for this visit:    Bronchitis    Reactive airway disease without complication, unspecified asthma severity, unspecified whether persistent  -     albuterol nebulizer solution 2.5 mg  -     betamethasone acetate-betamethasone sodium phosphate injection 6 mg  -     albuterol (PROVENTIL/VENTOLIN HFA) 90 mcg/actuation inhaler; Inhale 2 puffs into the lungs every 4 (four) hours as needed for Wheezing. Rescue    IF WHEN THINGS ARE WELL, YOU ARE NEEDING TO USE YOUR RESCUE INHALER MORE THAN TWICE IN 1 WEEK, YOU WILL NEED TO SEE YOUR PCP ABOUT BEING ON A PREVENTOR INHALER.      Make sure that you follow up with your primary care doctor in the next 2-5 days if needed .  Return to the Urgent Care if signs or symptoms change and certainly if you have worsening symptoms go to the nearest emergency department for further evaluation.

## 2018-11-06 ENCOUNTER — TELEPHONE (OUTPATIENT)
Dept: NEUROLOGY | Facility: CLINIC | Age: 58
End: 2018-11-06

## 2018-11-06 NOTE — TELEPHONE ENCOUNTER
A copy of patients mammogram has been received from [facility] date of service 11/02/2018. This will be scanned into the epic system.

## 2018-11-12 ENCOUNTER — TELEPHONE (OUTPATIENT)
Dept: URGENT CARE | Facility: CLINIC | Age: 58
End: 2018-11-12

## 2018-12-18 ENCOUNTER — PATIENT MESSAGE (OUTPATIENT)
Dept: NEUROLOGY | Facility: CLINIC | Age: 58
End: 2018-12-18

## 2018-12-18 ENCOUNTER — OFFICE VISIT (OUTPATIENT)
Dept: NEUROLOGY | Facility: CLINIC | Age: 58
End: 2018-12-18
Payer: COMMERCIAL

## 2018-12-18 VITALS
SYSTOLIC BLOOD PRESSURE: 130 MMHG | BODY MASS INDEX: 35.17 KG/M2 | WEIGHT: 191.13 LBS | HEIGHT: 62 IN | DIASTOLIC BLOOD PRESSURE: 83 MMHG | HEART RATE: 77 BPM

## 2018-12-18 DIAGNOSIS — E03.9 HYPOTHYROIDISM, UNSPECIFIED TYPE: ICD-10-CM

## 2018-12-18 DIAGNOSIS — G50.8 TRIGEMINAL NEURITIS: ICD-10-CM

## 2018-12-18 DIAGNOSIS — G47.00 INSOMNIA, UNSPECIFIED TYPE: ICD-10-CM

## 2018-12-18 DIAGNOSIS — F41.8 DEPRESSION WITH ANXIETY: ICD-10-CM

## 2018-12-18 DIAGNOSIS — G43.009 MIGRAINE WITHOUT AURA AND WITHOUT STATUS MIGRAINOSUS, NOT INTRACTABLE: Primary | ICD-10-CM

## 2018-12-18 PROCEDURE — 99214 OFFICE O/P EST MOD 30 MIN: CPT | Mod: S$GLB,,, | Performed by: PSYCHIATRY & NEUROLOGY

## 2018-12-18 PROCEDURE — 99999 PR PBB SHADOW E&M-EST. PATIENT-LVL III: CPT | Mod: PBBFAC,,, | Performed by: PSYCHIATRY & NEUROLOGY

## 2018-12-18 PROCEDURE — 3008F BODY MASS INDEX DOCD: CPT | Mod: CPTII,S$GLB,, | Performed by: PSYCHIATRY & NEUROLOGY

## 2018-12-18 RX ORDER — SULFAMETHOXAZOLE AND TRIMETHOPRIM 800; 160 MG/1; MG/1
TABLET ORAL
COMMUNITY
Start: 2018-12-10

## 2018-12-18 RX ORDER — MEDROXYPROGESTERONE ACETATE 10 MG/1
TABLET ORAL
COMMUNITY
Start: 2018-09-13

## 2018-12-18 RX ORDER — SPIRONOLACTONE 100 MG/1
TABLET, FILM COATED ORAL
COMMUNITY
Start: 2018-09-13

## 2018-12-18 RX ORDER — TRANEXAMIC ACID 650 MG/1
TABLET ORAL
Refills: 0 | COMMUNITY
Start: 2018-10-02

## 2018-12-18 RX ORDER — HYDROCODONE BITARTRATE AND ACETAMINOPHEN 5; 325 MG/1; MG/1
TABLET ORAL
Refills: 0 | COMMUNITY
Start: 2018-10-02 | End: 2019-09-03

## 2018-12-18 NOTE — PATIENT INSTRUCTIONS
Discussed with patient.  Continue magnesium oxide OTC supplementation 400 mg at bedtime daily.  Continue OTC headache medication.  Continue Ambien CR for sleep as needed.

## 2018-12-28 ENCOUNTER — TELEPHONE (OUTPATIENT)
Dept: NEUROLOGY | Facility: CLINIC | Age: 58
End: 2018-12-28

## 2018-12-28 ENCOUNTER — PATIENT MESSAGE (OUTPATIENT)
Dept: NEUROLOGY | Facility: CLINIC | Age: 58
End: 2018-12-28

## 2018-12-28 NOTE — TELEPHONE ENCOUNTER
Advised patient that Meniere's disease has been documented in my notes and dizziness is just a symptom of Meniere's disease.  She may have the disability company request medical records from [facility]

## 2018-12-28 NOTE — TELEPHONE ENCOUNTER
----- Message from Fausto Chow sent at 12/28/2018 12:12 PM CST -----  Needs Advice    Reason for call: Pt is asking to speak w/ the doctor regarding getting previous medical files sent to the doctor        Communication Preference: [phone]    Additional Information:

## 2018-12-28 NOTE — TELEPHONE ENCOUNTER
Patient states she does not see anything in her medical records stating she has vertigo which is one of her primary problems of which she is going to the disability office for. Please advise.

## 2019-04-04 NOTE — PROGRESS NOTES
Patient would like to know if she can have a referral to cardiac rehab. You can reach the pt at [phone]. Subjective:       Patient ID: Zeina Estrella is a 58 y.o. female.    Chief Complaint:  Headache      History of Present Illness  HPI   This is a 58-year-old female who is being followed by me with complaints of intermittent headaches.  She has had a history of these headaches for many years, diagnosed as migraines.  She was last seen by me 6 months ago.  Stress was a major precipitant and she does report that ever since she stopped working in July 2008, the headache frequency and intensity had significantly improved.  She was working as a [employer] .  She took some time off and subsequently started working as a .  An MRI scan of the brain done was normal.    The headaches are usually frontotemporal occasionally throbbing, and she has been using OTC Excedrin migraine with good relief.  She has had sleep problems and has been using Ambien only on an as-needed basis.   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  She has stopped using the Effexor and continues to use magnesium oxide OTC supplementation for headache prophylaxis.  She takes OTC Excedrin migraine for the headache which does help.       Review of Systems  Review of Systems   Constitutional: Negative.    HENT: Negative for hearing loss.    Eyes: Negative.  Negative for visual disturbance.   Respiratory: Negative.  Negative for shortness of breath.    Cardiovascular: Negative.  Negative for chest pain and palpitations.   Gastrointestinal: Negative.    Endocrine: Negative.    Genitourinary: Negative.    Musculoskeletal: Negative.  Negative for back pain, gait problem and neck pain.   Skin: Negative.    Allergic/Immunologic: Negative.    Neurological: Positive for dizziness and  headaches. Negative for tremors, seizures, syncope, speech difficulty, weakness and numbness.   Hematological: Negative.    Psychiatric/Behavioral: Positive for sleep disturbance. Negative for confusion and decreased concentration.       Objective:      Neurologic Exam     Mental Status   Oriented to person, place, and time.   Registration: recalls 3 of 3 objects. Recall at 5 minutes: recalls 3 of 3 objects. Follows 3 step commands.   Attention: normal. Concentration: normal.   Speech: speech is normal   Level of consciousness: alert  Knowledge: good. Able to perform simple calculations.   Able to name object. Able to read. Able to repeat. Able to write. Normal comprehension.     Cranial Nerves   Cranial nerves II through XII intact.     Motor Exam   Muscle bulk: normal  Overall muscle tone: normal    Strength   Strength 5/5 throughout.     Sensory Exam   Light touch normal.   Proprioception normal.   Pinprick normal.     Gait, Coordination, and Reflexes     Gait  Gait: normal    Coordination   Romberg: negative  Finger to nose coordination: normal    Tremor   Resting tremor: absent  Intention tremor: absent  Action tremor: absent    Reflexes   Right brachioradialis: 2+  Left brachioradialis: 2+  Right biceps: 2+  Left biceps: 2+  Right triceps: 2+  Left triceps: 2+  Right patellar: 2+  Left patellar: 2+  Right achilles: 2+  Left achilles: 2+  Right plantar: normal  Left plantar: normal      Physical Exam   Constitutional: She is oriented to person, place, and time. She appears well-developed and well-nourished.   HENT:   Head: Normocephalic and atraumatic.   Neck: Normal range of motion. Neck supple. Carotid bruit is not present.   Neurological: She is oriented to person, place, and time. She has normal strength. She has a normal Finger-Nose-Finger Test and a normal Romberg Test. Gait normal.   Reflex Scores:       Tricep reflexes are 2+ on the right side and 2+ on the left side.       Bicep reflexes are 2+ on the  right side and 2+ on the left side.       Brachioradialis reflexes are 2+ on the right side and 2+ on the left side.       Patellar reflexes are 2+ on the right side and 2+ on the left side.       Achilles reflexes are 2+ on the right side and 2+ on the left side.  Psychiatric: Her speech is normal.   Vitals reviewed.        Assessment:        1. Migraine without aura and without status migrainosus, not intractable    2. Insomnia, unspecified type    3. Trigeminal neuritis    4. Depression with anxiety    5. Hypothyroidism, unspecified type             Plan:       Discussed with patient.  Continue magnesium oxide OTC supplementation 400 mg at bedtime daily.  Continue OTC headache medication.  Continue Ambien CR for sleep as needed.  Follow-up in 6 months if stable.

## 2019-06-17 ENCOUNTER — OFFICE VISIT (OUTPATIENT)
Dept: URGENT CARE | Facility: CLINIC | Age: 59
End: 2019-06-17
Payer: COMMERCIAL

## 2019-06-17 ENCOUNTER — CLINICAL SUPPORT (OUTPATIENT)
Dept: URGENT CARE | Facility: CLINIC | Age: 59
End: 2019-06-17

## 2019-06-17 VITALS
TEMPERATURE: 98 F | SYSTOLIC BLOOD PRESSURE: 123 MMHG | HEART RATE: 82 BPM | DIASTOLIC BLOOD PRESSURE: 91 MMHG | BODY MASS INDEX: 34.23 KG/M2 | WEIGHT: 186 LBS | HEIGHT: 62 IN | RESPIRATION RATE: 16 BRPM | OXYGEN SATURATION: 98 %

## 2019-06-17 DIAGNOSIS — R53.83 FATIGUE, UNSPECIFIED TYPE: Primary | ICD-10-CM

## 2019-06-17 DIAGNOSIS — J06.9 UPPER RESPIRATORY TRACT INFECTION, UNSPECIFIED TYPE: Primary | ICD-10-CM

## 2019-06-17 DIAGNOSIS — R05.9 COUGH: ICD-10-CM

## 2019-06-17 PROCEDURE — 96372 THER/PROPH/DIAG INJ SC/IM: CPT | Mod: S$GLB,,, | Performed by: FAMILY MEDICINE

## 2019-06-17 PROCEDURE — 96372 PR INJECTION,THERAP/PROPH/DIAG2ST, IM OR SUBCUT: ICD-10-PCS | Mod: S$GLB,,, | Performed by: FAMILY MEDICINE

## 2019-06-17 PROCEDURE — 99214 OFFICE O/P EST MOD 30 MIN: CPT | Mod: 25,S$GLB,, | Performed by: FAMILY MEDICINE

## 2019-06-17 PROCEDURE — 99214 PR OFFICE/OUTPT VISIT, EST, LEVL IV, 30-39 MIN: ICD-10-PCS | Mod: 25,S$GLB,, | Performed by: FAMILY MEDICINE

## 2019-06-17 RX ORDER — PREDNISONE 10 MG/1
20 TABLET ORAL DAILY
Qty: 10 TABLET | Refills: 0 | Status: SHIPPED | OUTPATIENT
Start: 2019-06-17 | End: 2019-06-22

## 2019-06-17 RX ORDER — ERGOCALCIFEROL 1.25 MG/1
CAPSULE ORAL
COMMUNITY
Start: 2018-10-01

## 2019-06-17 RX ORDER — BENZONATATE 100 MG/1
200 CAPSULE ORAL 3 TIMES DAILY PRN
Qty: 60 CAPSULE | Refills: 1 | Status: SHIPPED | OUTPATIENT
Start: 2019-06-17 | End: 2019-09-03

## 2019-06-17 RX ORDER — CYANOCOBALAMIN 1000 UG/ML
1000 INJECTION, SOLUTION INTRAMUSCULAR; SUBCUTANEOUS ONCE
Status: COMPLETED | OUTPATIENT
Start: 2019-06-17 | End: 2019-06-17

## 2019-06-17 RX ADMIN — CYANOCOBALAMIN 1000 MCG: 1000 INJECTION, SOLUTION INTRAMUSCULAR; SUBCUTANEOUS at 10:06

## 2019-06-17 NOTE — PROGRESS NOTES
"Subjective:       Patient ID: Zeina Estrella is a 59 y.o. female.    Vitals:    06/17/19 0909 06/17/19 0912   BP: (!) 141/91 (!) 123/91   Pulse: 82    Resp: 16    Temp: 97.9 °F (36.6 °C)    TempSrc: Oral    SpO2: 98%    Weight: 84.4 kg (186 lb)    Height: 5' 1.5" (1.562 m)        Chief Complaint: Cough    Patient reports a productive cough that started 3 days ago.  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  She has tried garlic otc  She wants a shot -  Steroid and b12    Cough   This is a new problem. Episode onset: 3 days. The problem has been gradually worsening. The problem occurs every few minutes. The cough is productive of sputum. Associated symptoms include ear congestion (right ear), ear pain (right ear), headaches, nasal congestion, rhinorrhea and a sore throat (from coughing). Pertinent negatives include no chest pain, chills, eye redness, fever, myalgias, postnasal drip, shortness of breath or wheezing. The symptoms are aggravated by lying down. She has tried nothing for the symptoms. Her past medical history is significant for asthma and bronchitis.     Review of Systems   Constitution: Negative for chills, fever and malaise/fatigue.   HENT: Positive for congestion (nasal and chest), ear pain (right ear), rhinorrhea and sore throat (from coughing). Negative for hoarse voice and postnasal drip.    Eyes: Negative for discharge and redness.   Cardiovascular: Negative for chest pain, dyspnea on exertion and leg swelling.   Respiratory: Positive for cough and sputum production (clear). Negative for shortness of breath and wheezing.    Musculoskeletal: Negative for myalgias.   Gastrointestinal: Negative for abdominal pain and nausea.   Neurological: Positive for headaches.       Objective:      Physical Exam   Constitutional: She is oriented to person, place, and time. She appears well-developed and well-nourished. She is cooperative.  Non-toxic appearance. She does not appear ill. No " distress.   HENT:   Head: Normocephalic and atraumatic.   Right Ear: Hearing, tympanic membrane, external ear and ear canal normal.   Left Ear: Hearing, tympanic membrane, external ear and ear canal normal.   Nose: Nose normal. No mucosal edema, rhinorrhea or nasal deformity. No epistaxis. Right sinus exhibits no maxillary sinus tenderness and no frontal sinus tenderness. Left sinus exhibits no maxillary sinus tenderness and no frontal sinus tenderness.   Mouth/Throat: Uvula is midline and mucous membranes are normal. No trismus in the jaw. Normal dentition. No uvula swelling. Posterior oropharyngeal edema (cobblestoning, mild erythema) present. No posterior oropharyngeal erythema.   Eyes: Conjunctivae and lids are normal. No scleral icterus.   Sclera clear bilat   Neck: Trachea normal, full passive range of motion without pain and phonation normal. Neck supple.   Cardiovascular: Normal rate, regular rhythm, normal heart sounds, intact distal pulses and normal pulses.   Pulmonary/Chest: Effort normal and breath sounds normal. No respiratory distress.   Abdominal: Soft. Normal appearance and bowel sounds are normal. She exhibits no distension. There is no tenderness.   Musculoskeletal: Normal range of motion. She exhibits no edema or deformity.   Neurological: She is alert and oriented to person, place, and time. She exhibits normal muscle tone. Coordination normal.   Skin: Skin is warm, dry and intact. She is not diaphoretic. No pallor.   Psychiatric: She has a normal mood and affect. Her speech is normal and behavior is normal. Judgment and thought content normal. Cognition and memory are normal.   Nursing note and vitals reviewed.      Assessment:       1. Upper respiratory tract infection, unspecified type    2. Cough        Plan:       Zeina was seen today for cough.    Diagnoses and all orders for this visit:    Upper respiratory tract infection, unspecified type  -     predniSONE (DELTASONE) 10 MG tablet;  Take 2 tablets (20 mg total) by mouth once daily. for 5 days    Cough  -     benzonatate (TESSALON PERLES) 100 MG capsule; Take 2 capsules (200 mg total) by mouth 3 (three) times daily as needed for Cough.      Patient Instructions   If you were prescribed a narcotic or controlled medication, do not drive or operate heavy equipment or machinery while taking these medications.  You must understand that you've received an Urgent Care treatment only and that you may be released before all your medical problems are known or treated. You, the patient, will arrange for follow up care as instructed.  Follow up with your PCP or specialty clinic as directed in the next 1-2 weeks if not improved or as needed.  You can call [phone] to schedule an appointment with the appropriate provider.  If your condition worsens we recommend that you receive another evaluation at the emergency room immediately or contact your primary medical clinics after hours call service to discuss your concerns.  Please return here or go to the Emergency Department for any concerns or worsening of condition.    Viral Upper Respiratory Illness (Adult)  You have a viral upper respiratory illness (URI), which is another term for the common cold. This illness is contagious during the first few days. It is spread through the air by coughing and sneezing. It may also be spread by direct contact (touching the sick person and then touching your own eyes, nose, or mouth). Frequent handwashing will decrease risk of spread. Most viral illnesses go away within 7 to 10 days with rest and simple home remedies. Sometimes the illness may last for several weeks. Antibiotics will not kill a virus, and they are generally not prescribed for this condition.    Home care  · If symptoms are severe, rest at home for the first 2 to 3 days. When you resume activity, don't let yourself get too tired.  · Avoid being exposed to cigarette smoke (yours or others).  · You may  use acetaminophen or ibuprofen to control pain and fever, unless another medicine was prescribed. (Note: If you have chronic liver or kidney disease, have ever had a stomach ulcer or gastrointestinal bleeding, or are taking blood-thinning medicines, talk with your healthcare provider before using these medicines.) Aspirin should never be given to anyone under 18 years of age who is ill with a viral infection or fever. It may cause severe liver or brain damage.  · Your appetite may be poor, so a light diet is fine. Avoid dehydration by drinking 6 to 8 glasses of fluids per day (water, soft drinks, juices, tea, or soup). Extra fluids will help loosen secretions in the nose and lungs.  · Over-the-counter cold medicines will not shorten the length of time youre sick, but they may be helpful for the following symptoms: cough, sore throat, and nasal and sinus congestion. (Note: Do not use decongestants if you have high blood pressure.)  Follow-up care  Follow up with your healthcare provider, or as advised.  When to seek medical advice  Call your healthcare provider right away if any of these occur:  · Cough with lots of colored sputum (mucus)  · Severe headache; face, neck, or ear pain  · Difficulty swallowing due to throat pain  · Fever of 100.4°F (38°C)  Call 911, or get immediate medical care  Call emergency services right away if any of these occur:  · Chest pain, shortness of breath, wheezing, or difficulty breathing  · Coughing up blood  · Inability to swallow due to throat pain  Date Last Reviewed: 9/13/2015  © 2794-8157 [employer]. [address] [place] [address], [place] [address]. All rights reserved. This information is not intended as a substitute for professional medical care. Always follow your healthcare professional's instructions.

## 2019-06-17 NOTE — PATIENT INSTRUCTIONS
If you were prescribed a narcotic or controlled medication, do not drive or operate heavy equipment or machinery while taking these medications.  You must understand that you've received an Urgent Care treatment only and that you may be released before all your medical problems are known or treated. You, the patient, will arrange for follow up care as instructed.  Follow up with your PCP or specialty clinic as directed in the next 1-2 weeks if not improved or as needed.  You can call [phone] to schedule an appointment with the appropriate provider.  If your condition worsens we recommend that you receive another evaluation at the emergency room immediately or contact your primary medical clinics after hours call service to discuss your concerns.  Please return here or go to the Emergency Department for any concerns or worsening of condition.    Viral Upper Respiratory Illness (Adult)  You have a viral upper respiratory illness (URI), which is another term for the common cold. This illness is contagious during the first few days. It is spread through the air by coughing and sneezing. It may also be spread by direct contact (touching the sick person and then touching your own eyes, nose, or mouth). Frequent handwashing will decrease risk of spread. Most viral illnesses go away within 7 to 10 days with rest and simple home remedies. Sometimes the illness may last for several weeks. Antibiotics will not kill a virus, and they are generally not prescribed for this condition.    Home care  · If symptoms are severe, rest at home for the first 2 to 3 days. When you resume activity, don't let yourself get too tired.  · Avoid being exposed to cigarette smoke (yours or others).  · You may use acetaminophen or ibuprofen to control pain and fever, unless another medicine was prescribed. (Note: If you have chronic liver or kidney disease, have ever had a stomach ulcer or gastrointestinal bleeding, or are taking  blood-thinning medicines, talk with your healthcare provider before using these medicines.) Aspirin should never be given to anyone under 18 years of age who is ill with a viral infection or fever. It may cause severe liver or brain damage.  · Your appetite may be poor, so a light diet is fine. Avoid dehydration by drinking 6 to 8 glasses of fluids per day (water, soft drinks, juices, tea, or soup). Extra fluids will help loosen secretions in the nose and lungs.  · Over-the-counter cold medicines will not shorten the length of time youre sick, but they may be helpful for the following symptoms: cough, sore throat, and nasal and sinus congestion. (Note: Do not use decongestants if you have high blood pressure.)  Follow-up care  Follow up with your healthcare provider, or as advised.  When to seek medical advice  Call your healthcare provider right away if any of these occur:  · Cough with lots of colored sputum (mucus)  · Severe headache; face, neck, or ear pain  · Difficulty swallowing due to throat pain  · Fever of 100.4°F (38°C)  Call 911, or get immediate medical care  Call emergency services right away if any of these occur:  · Chest pain, shortness of breath, wheezing, or difficulty breathing  · Coughing up blood  · Inability to swallow due to throat pain  Date Last Reviewed: 9/13/2015  © 8531-1265 [employer]. [address] [place] [address], [place] [address]. All rights reserved. This information is not intended as a substitute for professional medical care. Always follow your healthcare professional's instructions.

## 2019-07-05 ENCOUNTER — TELEPHONE (OUTPATIENT)
Dept: OPHTHALMOLOGY | Facility: CLINIC | Age: 59
End: 2019-07-05

## 2019-07-05 NOTE — TELEPHONE ENCOUNTER
Spoke with patient. Informed her of Dr. Chacon's passing. And, that someone will be contacting her to reschedule her appointment. TR 7/5/19

## 2019-07-24 ENCOUNTER — TELEPHONE (OUTPATIENT)
Dept: NEUROLOGY | Facility: CLINIC | Age: 59
End: 2019-07-24

## 2019-07-25 ENCOUNTER — OFFICE VISIT (OUTPATIENT)
Dept: NEUROLOGY | Facility: CLINIC | Age: 59
End: 2019-07-25
Payer: COMMERCIAL

## 2019-07-25 VITALS
BODY MASS INDEX: 34.72 KG/M2 | HEART RATE: 66 BPM | WEIGHT: 188.69 LBS | DIASTOLIC BLOOD PRESSURE: 80 MMHG | SYSTOLIC BLOOD PRESSURE: 133 MMHG | HEIGHT: 62 IN

## 2019-07-25 DIAGNOSIS — M54.2 CERVICALGIA: ICD-10-CM

## 2019-07-25 DIAGNOSIS — G43.009 MIGRAINE WITHOUT AURA AND WITHOUT STATUS MIGRAINOSUS, NOT INTRACTABLE: ICD-10-CM

## 2019-07-25 DIAGNOSIS — G43.109 MIGRAINE WITH AURA AND WITHOUT STATUS MIGRAINOSUS, NOT INTRACTABLE: ICD-10-CM

## 2019-07-25 DIAGNOSIS — H81.09 MENIERE'S DISEASE, UNSPECIFIED LATERALITY: Primary | ICD-10-CM

## 2019-07-25 DIAGNOSIS — G47.00 INSOMNIA, UNSPECIFIED TYPE: ICD-10-CM

## 2019-07-25 DIAGNOSIS — F41.8 DEPRESSION WITH ANXIETY: ICD-10-CM

## 2019-07-25 PROCEDURE — 99999 PR PBB SHADOW E&M-EST. PATIENT-LVL IV: ICD-10-PCS | Mod: PBBFAC,,, | Performed by: PSYCHIATRY & NEUROLOGY

## 2019-07-25 PROCEDURE — 99215 OFFICE O/P EST HI 40 MIN: CPT | Mod: S$GLB,,, | Performed by: PSYCHIATRY & NEUROLOGY

## 2019-07-25 PROCEDURE — 99999 PR PBB SHADOW E&M-EST. PATIENT-LVL IV: CPT | Mod: PBBFAC,,, | Performed by: PSYCHIATRY & NEUROLOGY

## 2019-07-25 PROCEDURE — 99215 PR OFFICE/OUTPT VISIT, EST, LEVL V, 40-54 MIN: ICD-10-PCS | Mod: S$GLB,,, | Performed by: PSYCHIATRY & NEUROLOGY

## 2019-07-25 RX ORDER — SUMATRIPTAN SUCCINATE 25 MG/1
25 TABLET ORAL DAILY PRN
Qty: 18 TABLET | Refills: 5 | Status: SHIPPED | OUTPATIENT
Start: 2019-07-25 | End: 2019-09-03

## 2019-07-25 RX ORDER — TRAMADOL HYDROCHLORIDE 50 MG/1
50 TABLET ORAL EVERY 6 HOURS
COMMUNITY

## 2019-07-25 RX ORDER — MELOXICAM 15 MG/1
15 TABLET ORAL DAILY
COMMUNITY

## 2019-07-25 NOTE — PATIENT INSTRUCTIONS
You came to neurology clinic for routine follow up of your Meniere's disease. I put in a referral to ENT for a vestibular evaluation to assess your balance and your hearing to see how things are going at this time. I would like them to make some suggestions about treatments for the pressure sensation you feel around your right ear.    You have a multifactorial headache with contributions from migraine, cervicalgia, and medication overuse.  For migraines, please write a headache diary with special attention to headache symptoms, durations, and triggers. I will order you a soft collar, please try this at night. You can also used a rolled towel underneath your neck while you sleep for comfort. Ice/heat/massage. Physical Therapy for neck muscle spasms. Please get in the pool or do water aerobics to help take some of the strain off your back and to work on strength and flexibility.  For pain, you can take over-the-counter ibuprofen (max dose 400-600 mg every 8 hr)/naproxen (max dose 500 mg every 12 hr) or Tylenol (max dose 1 g every 8 hr). You can also take sumatriptan for migraine headaches. Take 25mg when the visual symptoms happen. If a headache develops, you can take a second pill after 1 hour.   It is important that you do not take any of these medications more than once every 4 days.  If you take them more frequently than that, it can contribute to a medication overuse headache and other issues.    Please return to clinic after 4 weeks with your headache diary and we will discuss the next steps then.     Thelma De MD PhD  Neurology-Epilepsy  [facility].  [facility]
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
## 2019-08-26 ENCOUNTER — CLINICAL SUPPORT (OUTPATIENT)
Dept: REHABILITATION | Facility: HOSPITAL | Age: 59
End: 2019-08-26
Attending: PSYCHIATRY & NEUROLOGY
Payer: COMMERCIAL

## 2019-08-26 DIAGNOSIS — H55.82 DEFICIENCY OF SMOOTH PURSUIT MOVEMENTS: ICD-10-CM

## 2019-08-26 DIAGNOSIS — H81.8X9 DEFICIT OF VESTIBULO-OCULAR REFLEX: ICD-10-CM

## 2019-08-26 DIAGNOSIS — R26.89 IMPAIRMENT OF BALANCE: ICD-10-CM

## 2019-08-26 DIAGNOSIS — H55.81 SACCADIC EYE MOVEMENT DEFICIENCY: ICD-10-CM

## 2019-08-26 PROCEDURE — 97162 PT EVAL MOD COMPLEX 30 MIN: CPT | Mod: PO

## 2019-08-26 NOTE — PLAN OF CARE
[facility]  Physical Therapy Neurological Rehabilitation Initial Evaluation    Name: Zeina Estrella  Clinic Number: [id number]    Therapy Diagnosis:   Encounter Diagnoses   Name Primary?    Impairment of balance     Deficiency of smooth pursuit movements     Saccadic eye movement deficiency     Deficit of vestibulo-ocular reflex      Physician: Thelma De MD PhD    Physician Orders: PT Eval and Treat   Medical Diagnosis from Referral: Meniere's disease, unspecified laterality  Evaluation Date: 8/26/2019  Authorization Period Expiration: 07/25/19 to 07/24/20  Plan of Care Expiration: 10/21/19  Visit # / Visits authorized: 01/ 01 (pending further authorization)    Time In: 15:45  Time Out: 16:30  Total Billable Time: 45 minutes    Precautions: standard, emotional deficits, h/o multiple falls    Subjective   Date of onset: increase in falls over the past 6 months  History of current condition - Zeina reports: that she used to have bad vertigo attacks years ago. She retired from working from [employer] in 2008 due to these issues. She gets occasional waves of dizziness when moving quickly. Her last significant episode of dizziness was ~1 summer ago when flying to [place]. 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 She has a h/o falls over the past 2 years but falls have gotten more intense over the past few months. Denies recent change in hearing.      Medical History:   Past Medical History:   Diagnosis Date    Endometriosis     Hypothyroidism     Insomnia     Menopause        Surgical History:   Zeina Estrella  has a past surgical history that includes Hysterectomy.    Medications:   Zeina has a current medication list which includes the following prescription(s): albuterol, armour thyroid, aspirin-acetaminophen-caffeine 250-250-65 mg, benzonatate, engerix-b (pf), ergocalciferol, fluticasone propionate,  "hydrocodone-acetaminophen, medroxyprogesterone, meloxicam, spironolactone, sumatriptan, terconazole, tramadol, tranexamic acid, typhim vi, venlafaxine, vitamin d2, and zolpidem.    Allergies:   Review of patient's allergies indicates:   Allergen Reactions    Erythromycin Shortness Of Breath    Flu vac qs 2017-18(6-35mo)(pf)         Imaging: none recent on file    Prior Therapy: none recently  Social History: alone  Falls: 2 falls over the past 3 months; falls tend to occur when she is rushing  DME: grab bars near tub  Home Environment: [facility], [address], hand rail present   Exercise Routine / History: none currently  Family Present at time of Eval: none present  Occupation: teach at [employer]  Prior Level of Function: (I) with functional mobility/ADL, driving, cooking, cleaning  Current Level of Function: same as above    History of migraines: Yes; last migraine was yesterday morning.     Pain:  Current 5/10, worst 10/10, best 2/10   Location: midline cervical-thoracic junction  Description: Aching  Aggravating Factors: Sitting, stress  Easing Factors: heat, medication    Pts goals: "To see what you can do for my unsteadiness and to get exercises for my equilibrium."    Objective   - Follows commands: 100% of time   - Speech: no deficits     BP: normal    Mental status: alert, oriented to person, place, and time  Appearance: Casually dressed  Behavior:  cooperative  Attention Span and Concentration:  Normal    Dominant hand:  ambidextrous     Posture Alignment in sitting and standing:   Head: forward head, hyper-lordotic cervical curve   Scapulae: rounded shoulder   Trunk: hyper kyphotic curve in superior portion of thoracic spine    Pelvis: NT   Legs: NT     Sensation: Light Touch: occasional numbness in right arm, mainly in the morning          Proprioception: Intact, Kinesthesia NT         Visual/Oculomotor Performance  Tracking/Smooth Pursuits:Impaired: min visual slippage, min episode of " "dizziness  Saccades: Impaired: decreased speed, fatigue noted  Acuity: surgery so only reading glasses occasionally  R/L discrimination: Intact  Visual field: Intact  Convergence: WFL, < 5 cm  VOR 1: Impaired: min visual slippage, min dizziness  VCR: NT    Coordination: not tested this date    ROM:     CERVICAL SPINE  Flexion 62 degrees (80-90 deg)  Extension 54 degrees (70-80 deg), pain with end range  L side bend 30 degrees, R side bend 33 degrees (20-45 degrees)  L rotation 62 degrees, R rotation 64 degrees (70-90 degrees)  Are concurrent symptoms present with any of these movements: See above    Upper Cervical Instability Testing: Performed with patient in sitting  Sharp Ashleigh: (-)  Alar Ligament testing: (-) bilaterally    Modified VAS (Vertebral Artery Screen), in sitting (rotation, then extension):  R: (-)  L: (-)    UPPER EXTREMITY--AROM/PROM- not tested this date       RANGE OF MOTION--LOWER EXTREMITIES  (R) LE Hip: WFL   Knee: WFL   Ankle: WFL    (L) LE: Hip: WFL   Knee: WFL   Ankle: WFL    Strength: manual muscle test grades below   Upper Extremity Strength- not formally tested this date  Lower Extremity Strength- not formally tested this date  Abdominal Strength: at least 3/5    JACINDA SENSORY TESTING:  (P= Pass, F= Fail; note any sway; hold each position for 30")  Condition 1: (firm surface/feet together/eyes open) P  Condition 2: (firm surface/feet together/eyes closed) P, increased sensation of sway  Condition 3: (firm surface/feet in tandem/eyes open) F, 5 sec  Condition 4: (firm surface/feet in tandem/eyes closed) NT 2/2 safety concerns  Condition 5: (soft surface/feet together/eyes open) F, 20 sec  Condition 6: (soft surface/feet together/eyes closed) F, 9 sec  Condition 7: (Fakuda step test), measure distance varied from center starting position, > 30 deg deviation to either side indicates hypofunction of biased side NT     Evaluation   Single Limb Stance R LE 19 sec  (<10 sec = HIGH FALL RISK) "   Single Limb Stance L LE 15 sec  (<10 sec = HIGH FALL RISK)     Functional Gait Assessment:   1. Gait on level surface =  3  2. Change in Gait Speed = 3  3. Gait with horizontal head turns  = 2, dizziness   4. Gait with vertical head turns = 2, dizziness  5. Gait with pivot turns = 3  6. Step over obstacle = 3  7. Gait with Narrow RAMON = 2  8. Gait with eyes closed = 2  9. Ambulating Backwards = 3  10. Steps = 2     Score 25/30   Score:   <22/30 fall risk   <20/30 fall risk in older adults   <18/30 fall risk in Parkinsons     Gait Assessment:  - AD used: none  - Assistance: independent  - Distance: community distances    GAIT DEVIATIONS:  Zeina displays the following deviations with ambulation: no significant deviations noted    Endurance Deficit: mild     POSITIONAL CANAL TESTING- not indicated at this time  Looking for nystagmus (slow drift to affected side with quick correction away)      CMS Impairment/Limitation/Restriction for FOTO for Vertigo Survey    Therapist reviewed FOTO scores for Zeina Estrella on 8/26/2019.   FOTO documents entered into [employer] - see Media section.    Limitation Score: 54%  Category: Mobility       TREATMENT     No treatment provided this date     Home Exercises and Patient Education Provided    Education provided:   - POC, scheduling, purpose of vestibular therapy    Written Home Exercises Provided: to be provided at first follow up    Assessment   Zeina is a 59 y.o. female referred to outpatient Physical Therapy with a medical diagnosis of Meniere's disease, unspecified laterality. Pt presents with impaired oculomotor function, impaired balance, and decreased motion tolerance. During oculomotor testing, patient demonstrates difficulty with smooth pursuits, saccades, and VOR 1, with dizziness and fatigue noted with each of these activities. VAS (-) bilaterally. Lower cervical AROM grossly WFL for age related postural changes. Patient demonstrates fair performance on GST with  most difficulty noted with vision eliminated activities and tandem stance. Therefore, Fakuda step test not safe to perform this date. Patient demonstrates good performance on FGA with most difficulty and reports of dizziness noted with ambulation with head turns and with vision eliminated ambulation. BLE SLS score good at this time. Patient's current presentation is consistent with vestibular dysfunction of combined peripheral (Meniere's disease) and central origin (migraines). Patient to benefit from continued PT intervention to address vestibular deficits listed above, to improved safety with functional mobility, and to decrease risk of fall.     Pt prognosis is Good.   Pt will benefit from skilled outpatient Physical Therapy to address the deficits stated above and in the chart below, provide pt/family education, and to maximize pt's level of independence.     Plan of care discussed with patient: Yes  Pt's spiritual, cultural and educational needs considered and patient is agreeable to the plan of care and goals as stated below:     Anticipated Barriers for therapy: extensive co-morbidities, active migraines, h/o falls, emotional deficits    Medical Necessity is demonstrated by the following  History  Co-morbidities and personal factors that may impact the plan of care Co-morbidities:   migraines, depression with anxiety, recent falls, cervicalgia, insomnia    Personal Factors:   coping style     high   Examination  Body Structures and Functions, activity limitations and participation restrictions that may impact the plan of care Body Regions:   head  neck  trunk    Body Systems:    gross symmetry  ROM  strength  gross coordinated movement  balance  gait  transfers  transitions  motor control  motor learning  oculomotor function, vestibular function    Participation Restrictions:   Work schedule, no current HEP    Activity limitations:   Learning and applying knowledge  no deficits    General Tasks and  Commands  no deficits    Communication  no deficits    Mobility  walking    Self care  no deficits    Domestic Life  no deficits    Interactions/Relationships  no deficits    Life Areas  employment    Community and Social Life  community life  recreation and leisure  Restorationist and spirituality         moderate   Clinical Presentation evolving clinical presentation with changing clinical characteristics- current migraines, recent falls moderate   Decision Making/ Complexity Score: moderate     Goals:  Short Term Goals: 4 weeks   1. Patient to be (I) with established HEP  2. Patient to perform smooth pursuits to WFL with reading component for improved oculomotor tracking  3. Patient to perform saccades at 80 bpm WFL for improved oculomotor endurance  4. Patient to perform VOR 1 at 80 bpm WFL for improved vestibular function  5. Patient to improve GST condition 3 to at least 10 seconds for improved balance in low vision environments  6. Patient to improve GST condition 5 to at least 30 seconds for improved balance and decreased fall risk  7. Patient to improve GST condition 6 to at least 20 seconds for improved balance and decreased fall risk    Long Term Goals: 8 weeks   1. Patient to be (I) with advanced HEP  2. Patient to perform saccades at 100 bpm WFL for improved oculomotor endurance  3. Patient to perform VOR 1 at 100 bpm WFL for improved vestibular function  4. Patient to improve GST condition 3 to at least 15 seconds for improved balance in low vision environments  5. Patient to improve GST condition 6 to at least 30 seconds for improved balance and decreased fall risk  6. Patient to improve FGA to at least 27/30 for improved balance and decreased fall risk    Plan   Plan of care Certification: 8/26/2019 to 10/21/19.    Outpatient Physical Therapy 2 times weekly for 8 weeks to include the following interventions: Gait Training, Manual Therapy, Moist Heat/ Ice, Neuromuscular Re-ed, Orthotic Management and  Training, Patient Education, Self Care, Therapeutic Activites and Therapeutic Exercise.     Nina Gibson, PT

## 2019-09-03 ENCOUNTER — OFFICE VISIT (OUTPATIENT)
Dept: OTOLARYNGOLOGY | Facility: CLINIC | Age: 59
End: 2019-09-03
Payer: COMMERCIAL

## 2019-09-03 ENCOUNTER — CLINICAL SUPPORT (OUTPATIENT)
Dept: AUDIOLOGY | Facility: CLINIC | Age: 59
End: 2019-09-03
Payer: COMMERCIAL

## 2019-09-03 VITALS — WEIGHT: 190.06 LBS | HEIGHT: 62 IN | BODY MASS INDEX: 34.98 KG/M2

## 2019-09-03 DIAGNOSIS — H81.8X9 OTHER DISORDERS OF VESTIBULAR FUNCTION, UNSPECIFIED EAR: Primary | ICD-10-CM

## 2019-09-03 DIAGNOSIS — R42 DIZZINESS: ICD-10-CM

## 2019-09-03 DIAGNOSIS — G43.109 MIGRAINOUS VERTIGO: Primary | ICD-10-CM

## 2019-09-03 PROCEDURE — 92557 COMPREHENSIVE HEARING TEST: CPT | Mod: S$GLB,,, | Performed by: AUDIOLOGIST-HEARING AID FITTER

## 2019-09-03 PROCEDURE — 99999 PR PBB SHADOW E&M-EST. PATIENT-LVL I: ICD-10-PCS | Mod: PBBFAC,,, | Performed by: AUDIOLOGIST-HEARING AID FITTER

## 2019-09-03 PROCEDURE — 99203 OFFICE O/P NEW LOW 30 MIN: CPT | Mod: S$GLB,,, | Performed by: OTOLARYNGOLOGY

## 2019-09-03 PROCEDURE — 92557 PR COMPREHENSIVE HEARING TEST: ICD-10-PCS | Mod: S$GLB,,, | Performed by: AUDIOLOGIST-HEARING AID FITTER

## 2019-09-03 PROCEDURE — 99999 PR PBB SHADOW E&M-EST. PATIENT-LVL III: CPT | Mod: PBBFAC,,, | Performed by: OTOLARYNGOLOGY

## 2019-09-03 PROCEDURE — 92567 PR TYMPA2METRY: ICD-10-PCS | Mod: S$GLB,,, | Performed by: AUDIOLOGIST-HEARING AID FITTER

## 2019-09-03 PROCEDURE — 99999 PR PBB SHADOW E&M-EST. PATIENT-LVL III: ICD-10-PCS | Mod: PBBFAC,,, | Performed by: OTOLARYNGOLOGY

## 2019-09-03 PROCEDURE — 92567 TYMPANOMETRY: CPT | Mod: S$GLB,,, | Performed by: AUDIOLOGIST-HEARING AID FITTER

## 2019-09-03 PROCEDURE — 99999 PR PBB SHADOW E&M-EST. PATIENT-LVL I: CPT | Mod: PBBFAC,,, | Performed by: AUDIOLOGIST-HEARING AID FITTER

## 2019-09-03 PROCEDURE — 99203 PR OFFICE/OUTPT VISIT, NEW, LEVL III, 30-44 MIN: ICD-10-PCS | Mod: S$GLB,,, | Performed by: OTOLARYNGOLOGY

## 2019-09-03 NOTE — LETTER
September 5, 2019      Thelma De MD PhD  [address]  [address]  [facility] [address]           [facility]  [address]  [facility] [address]  Phone: [phone]  Fax: [phone]          Patient: Zeina Estrella   MR Number: [id number]   YOB: 1960   Date of Visit: 9/3/2019       Dear Dr. Thelma De:    Thank you for referring Zeina Estrella to me for evaluation. Attached you will find relevant portions of my assessment and plan of care.    If you have questions, please do not hesitate to call me. I look forward to following Zeina Estrella along with you.    Sincerely,    Vick Grant MD    Enclosure  CC:  No Recipients    If you would like to receive this communication electronically, please contact [email or web address] or [phone] to request more information on [employer] Link access.    For providers and/or their staff who would like to refer a patient to [facility], please contact us through our one-stop-shop provider referral line, [facility], at [phone].    If you feel you have received this communication in error or would no longer like to receive these types of communications, please e-mail [email or web address]

## 2019-09-03 NOTE — PROGRESS NOTES
Zeina Estrella was seen in the clinic today for a hearing evaluation. Ms. Estrella reported experiencing dizziness for many years. She stated being diagnosed with Meniere's disease in the 90's.      Otoscopy was unremarkable. Audiological testing revealed hearing within normal limits, bilaterally. A speech reception threshold was obtained at 5 dBHL in the right ear and 10 dBHL in the left ear. Speech recognition was 100% in the left ear and 96% in the right ear.    Tympanometry revealed normal Type A tympanograms in both ears.    Recommendations:  1. Otologic evaluation  2. Annual hearing evaluation

## 2019-09-05 ENCOUNTER — OFFICE VISIT (OUTPATIENT)
Dept: NEUROLOGY | Facility: CLINIC | Age: 59
End: 2019-09-05
Payer: COMMERCIAL

## 2019-09-05 VITALS
SYSTOLIC BLOOD PRESSURE: 119 MMHG | HEART RATE: 78 BPM | WEIGHT: 188.5 LBS | DIASTOLIC BLOOD PRESSURE: 80 MMHG | HEIGHT: 62 IN | BODY MASS INDEX: 34.69 KG/M2

## 2019-09-05 DIAGNOSIS — G43.009 MIGRAINE WITHOUT AURA AND WITHOUT STATUS MIGRAINOSUS, NOT INTRACTABLE: Primary | ICD-10-CM

## 2019-09-05 DIAGNOSIS — G47.00 INSOMNIA, UNSPECIFIED TYPE: ICD-10-CM

## 2019-09-05 DIAGNOSIS — R25.2 SPASTIC: ICD-10-CM

## 2019-09-05 DIAGNOSIS — F41.8 DEPRESSION WITH ANXIETY: ICD-10-CM

## 2019-09-05 DIAGNOSIS — R32 URINARY INCONTINENCE, UNSPECIFIED TYPE: ICD-10-CM

## 2019-09-05 DIAGNOSIS — E66.09 CLASS 2 OBESITY DUE TO EXCESS CALORIES WITH BODY MASS INDEX (BMI) OF 35.0 TO 35.9 IN ADULT, UNSPECIFIED WHETHER SERIOUS COMORBIDITY PRESENT: ICD-10-CM

## 2019-09-05 DIAGNOSIS — R29.6 FALLS FREQUENTLY: ICD-10-CM

## 2019-09-05 DIAGNOSIS — M54.2 CERVICALGIA: ICD-10-CM

## 2019-09-05 PROCEDURE — 99999 PR PBB SHADOW E&M-EST. PATIENT-LVL IV: ICD-10-PCS | Mod: PBBFAC,,, | Performed by: PSYCHIATRY & NEUROLOGY

## 2019-09-05 PROCEDURE — 99215 OFFICE O/P EST HI 40 MIN: CPT | Mod: S$GLB,,, | Performed by: PSYCHIATRY & NEUROLOGY

## 2019-09-05 PROCEDURE — 99999 PR PBB SHADOW E&M-EST. PATIENT-LVL IV: CPT | Mod: PBBFAC,,, | Performed by: PSYCHIATRY & NEUROLOGY

## 2019-09-05 PROCEDURE — 99215 PR OFFICE/OUTPT VISIT, EST, LEVL V, 40-54 MIN: ICD-10-PCS | Mod: S$GLB,,, | Performed by: PSYCHIATRY & NEUROLOGY

## 2019-09-05 RX ORDER — NORTRIPTYLINE HYDROCHLORIDE 10 MG/1
20 CAPSULE ORAL NIGHTLY
Qty: 60 CAPSULE | Refills: 11 | Status: SHIPPED | OUTPATIENT
Start: 2019-09-05 | End: 2019-11-07 | Stop reason: DRUGHIGH

## 2019-09-05 RX ORDER — TIZANIDINE HYDROCHLORIDE 2 MG/1
2 CAPSULE, GELATIN COATED ORAL NIGHTLY
Qty: 10 CAPSULE | Refills: 0 | Status: SHIPPED | OUTPATIENT
Start: 2019-09-05 | End: 2019-09-15
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
## 2019-09-05 NOTE — PROGRESS NOTES
Subjective:       Patient ID: Zeina Estrella is a 59 y.o. female.    Chief Complaint: Headache    HPI     Zeina Estrella is a 59 y.o. female with history of migraines here on referral from neurology for evaluation.  She has a history of meniere's disease, however she denies any history of hearing loss or fluctuating hearing loss. She says she has had issues with short lasting spinning as well as chronic motion sickness like symptoms.  She has a long history of migraine headaches which she manages by taking excedrin midgraine 4 x a week.      Review of Systems   Constitutional: Negative for chills, fever and unexpected weight change.   HENT: Negative for sore throat and trouble swallowing.    Eyes: Negative for pain and visual disturbance.   Respiratory: Negative for apnea and shortness of breath.    Cardiovascular: Negative for chest pain and palpitations.   Gastrointestinal: Negative for abdominal pain and nausea.   Endocrine: Negative for cold intolerance and heat intolerance.   Musculoskeletal: Negative for joint swelling and neck stiffness.   Skin: Negative for color change and rash.   Neurological: Negative for facial asymmetry and headaches.   Hematological: Negative for adenopathy. Does not bruise/bleed easily.   Psychiatric/Behavioral: Negative for agitation. The patient is not nervous/anxious.        Objective:      Physical Exam   Constitutional: She is oriented to person, place, and time. She appears well-developed and well-nourished. No distress.   HENT:   Head: Normocephalic and atraumatic.   Right Ear: Tympanic membrane, external ear and ear canal normal.   Left Ear: Tympanic membrane, external ear and ear canal normal.   Nose: Nose normal.   Mouth/Throat: Uvula is midline, oropharynx is clear and moist and mucous membranes are normal.   Johnson: Midline  Rinne: AC > BC @ 512Hz   Eyes: Pupils are equal, round, and reactive to light. Conjunctivae and EOM are normal.   Neck: Normal range of  motion. No tracheal deviation present. No thyromegaly present.   Cardiovascular: Normal rate and regular rhythm.   Pulmonary/Chest: Effort normal. No respiratory distress.   Musculoskeletal: Normal range of motion.   Lymphadenopathy:        Head (right side): No submental, no submandibular and no tonsillar adenopathy present.        Head (left side): No submental, no submandibular and no tonsillar adenopathy present.     She has no cervical adenopathy.   Neurological: She is alert and oriented to person, place, and time.   Psychiatric: She has a normal mood and affect. Her behavior is normal.   Nursing note and vitals reviewed.      Data:      Audiogram tracings independently reviewed and discussed with patient shows normal thresholds, WRS, tympanograms, and reflexes AU  Assessment:       1. Migrainous vertigo    2. Dizziness        Plan:     In summary this is a pleasant 59 y.o. with a history of migraines and chronic dizziness. She has previously been diagnosed with meniere's disease per the patient, however she does not meet the clinical diagnostic criteria given she does not have a history of hearing loss associated with her symptoms.  I believe her dizziness is likely secondary to atypical migraine, given a strong history of migraine headaches.  40% of migraine patients experience vestibular symptoms, and ear pressure and tinnitus may be part of a migraine syndrome. Recommend strict migraine diet and medical management of her migraine headaches which should improve her dizziness.

## 2019-09-05 NOTE — PATIENT INSTRUCTIONS
You returned to neurology clinic because of 1) migraine headaches, 2) significant neck pain, 3) frequent falls with urinary incontinence. MRI Back (cervical spine thoracic spine). Bariatric referral. Nortriptyline, 10mg at night before bed. In 1 month increase to 20mg. Tizanidine 2mg at night before bed. Please make sure you are supporting your neck at night. You do not need vestibular therapy. I have put in a new PT referral for your neck. Keep writing the symptom diary. Lets see if we have any improvement. Come back to clinic in 1-3 months or sooner if needed.    Thelma De MD PhD  Neurology-Epilepsy  [facility].  [facility]

## 2019-09-09 ENCOUNTER — DOCUMENTATION ONLY (OUTPATIENT)
Dept: REHABILITATION | Facility: HOSPITAL | Age: 59
End: 2019-09-09

## 2019-09-09 DIAGNOSIS — H81.8X9 DEFICIT OF VESTIBULO-OCULAR REFLEX: ICD-10-CM

## 2019-09-09 DIAGNOSIS — H55.82 DEFICIENCY OF SMOOTH PURSUIT MOVEMENTS: ICD-10-CM

## 2019-09-09 DIAGNOSIS — R26.89 IMPAIRMENT OF BALANCE: ICD-10-CM

## 2019-09-09 DIAGNOSIS — H55.81 SACCADIC EYE MOVEMENT DEFICIENCY: ICD-10-CM

## 2019-09-09 NOTE — PROGRESS NOTES
OUTPATIENT PHYSICAL THERAPY DISCHARGE SUMMARY     Name: Zeina Estrella  Clinic Number: [id number]    Diagnosis:   Encounter Diagnoses   Name Primary?    Impairment of balance     Deficiency of smooth pursuit movements     Saccadic eye movement deficiency     Deficit of vestibulo-ocular reflex      Physician: Thelma De MD PhD  Treatment Orders: PT Eval and Treat  Past Medical History:   Diagnosis Date    Endometriosis     Hypothyroidism     Insomnia     Menopause        Initial visit: 08/26/19  Date of Last visit: 08/26/19  Date of Discharge Note:  09/09/19  Total Visits Received: 1  Missed Visits: 2 cancelled appointments  ASSESSMENT   Status Towards Goals Met:   Please refer to evaluation note on 08/26/19 for most updated functional status.     Goals Not achieved and why: Patient cancelled her 2 scheduled follow up appointments and did not reschedule any further appointments.         Discharge reason : Pt has not re-scheduled further follow-up sessions    PLAN   This patient is discharged from Outpatient Physical Therapy Services.     Nina Gibson, PT  09/09/2019

## 2019-09-10 ENCOUNTER — PATIENT MESSAGE (OUTPATIENT)
Dept: NEUROLOGY | Facility: CLINIC | Age: 59
End: 2019-09-10

## 2019-09-12 ENCOUNTER — TELEPHONE (OUTPATIENT)
Dept: SLEEP MEDICINE | Facility: CLINIC | Age: 59
End: 2019-09-12

## 2019-09-12 NOTE — TELEPHONE ENCOUNTER
Message     Appointment Request From: Zeina Estrella      With Provider: Thelma De MD PhD [[facility] [address]]      Preferred Date Range: 12/4/2019 - 12/5/2019      Preferred Times: Wednesday Afternoon, Thursday Afternoon      Reason for visit: Existing Patient      Comments:   Looking for an appointment time of 3:30pm or 4:00pm either date.

## 2019-09-18 ENCOUNTER — HOSPITAL ENCOUNTER (OUTPATIENT)
Dept: RADIOLOGY | Facility: OTHER | Age: 59
Discharge: HOME | End: 2019-09-18
Attending: PSYCHIATRY & NEUROLOGY
Payer: COMMERCIAL

## 2019-09-18 DIAGNOSIS — R25.2 SPASTIC: ICD-10-CM

## 2019-09-18 DIAGNOSIS — R29.6 FALLS FREQUENTLY: ICD-10-CM

## 2019-09-18 DIAGNOSIS — M54.2 CERVICALGIA: ICD-10-CM

## 2019-09-18 DIAGNOSIS — R32 URINARY INCONTINENCE, UNSPECIFIED TYPE: ICD-10-CM

## 2019-09-18 PROCEDURE — 72141 MRI NECK SPINE W/O DYE: CPT | Mod: TC

## 2019-09-18 PROCEDURE — 72148 MRI LUMBAR SPINE WITHOUT CONTRAST: ICD-10-PCS | Mod: 26,,, | Performed by: RADIOLOGY

## 2019-09-18 PROCEDURE — 72141 MRI CERVICAL SPINE WITHOUT CONTRAST: ICD-10-PCS | Mod: 26,,, | Performed by: RADIOLOGY

## 2019-09-18 PROCEDURE — 72146 MRI THORACIC SPINE WITHOUT CONTRAST: ICD-10-PCS | Mod: 26,,, | Performed by: RADIOLOGY

## 2019-09-18 PROCEDURE — 72146 MRI CHEST SPINE W/O DYE: CPT | Mod: TC

## 2019-09-18 PROCEDURE — 72148 MRI LUMBAR SPINE W/O DYE: CPT | Mod: 26,,, | Performed by: RADIOLOGY

## 2019-09-18 PROCEDURE — 72141 MRI NECK SPINE W/O DYE: CPT | Mod: 26,,, | Performed by: RADIOLOGY

## 2019-09-18 PROCEDURE — 72146 MRI CHEST SPINE W/O DYE: CPT | Mod: 26,,, | Performed by: RADIOLOGY

## 2019-09-18 PROCEDURE — 72148 MRI LUMBAR SPINE W/O DYE: CPT | Mod: TC

## 2019-09-20 ENCOUNTER — PATIENT MESSAGE (OUTPATIENT)
Dept: NEUROLOGY | Facility: HOSPITAL | Age: 59
End: 2019-09-20

## 2019-09-23 ENCOUNTER — PATIENT MESSAGE (OUTPATIENT)
Dept: NEUROLOGY | Facility: CLINIC | Age: 59
End: 2019-09-23

## 2019-09-25 ENCOUNTER — CLINICAL SUPPORT (OUTPATIENT)
Dept: REHABILITATION | Facility: HOSPITAL | Age: 59
End: 2019-09-25
Attending: PSYCHIATRY & NEUROLOGY
Payer: COMMERCIAL

## 2019-09-25 DIAGNOSIS — H55.82 DEFICIENCY OF SMOOTH PURSUIT MOVEMENTS: ICD-10-CM

## 2019-09-25 DIAGNOSIS — H55.81 SACCADIC EYE MOVEMENT DEFICIENCY: ICD-10-CM

## 2019-09-25 DIAGNOSIS — R26.89 IMPAIRMENT OF BALANCE: ICD-10-CM

## 2019-09-25 DIAGNOSIS — M62.89 ABNORMAL INCREASED MUSCLE TONE: ICD-10-CM

## 2019-09-25 DIAGNOSIS — H81.8X9 DEFICIT OF VESTIBULO-OCULAR REFLEX: ICD-10-CM

## 2019-09-25 PROCEDURE — 97162 PT EVAL MOD COMPLEX 30 MIN: CPT | Mod: PO

## 2019-09-25 PROCEDURE — 97140 MANUAL THERAPY 1/> REGIONS: CPT | Mod: PO

## 2019-09-26 PROBLEM — H81.8X9 DEFICIT OF VESTIBULO-OCULAR REFLEX: Status: RESOLVED | Noted: 2019-08-26 | Resolved: 2019-09-26

## 2019-09-26 PROBLEM — H55.82 DEFICIENCY OF SMOOTH PURSUIT MOVEMENTS: Status: RESOLVED | Noted: 2019-08-26 | Resolved: 2019-09-26

## 2019-09-26 PROBLEM — R26.89 IMPAIRMENT OF BALANCE: Status: RESOLVED | Noted: 2019-08-26 | Resolved: 2019-09-26

## 2019-09-26 PROBLEM — H55.81 SACCADIC EYE MOVEMENT DEFICIENCY: Status: RESOLVED | Noted: 2019-08-26 | Resolved: 2019-09-26

## 2019-09-26 PROBLEM — M62.89 ABNORMAL INCREASED MUSCLE TONE: Status: ACTIVE | Noted: 2019-09-26

## 2019-09-29 NOTE — PLAN OF CARE
[facility]  Physical Therapy Initial Evaluation    Name: Zeina Estrella  Clinic Number: [id number]      Physician: Thelma De MD PhD    Diagnosis:   Encounter Diagnoses   Name Primary?    Impairment of balance     Deficiency of smooth pursuit movements     Saccadic eye movement deficiency     Deficit of vestibulo-ocular reflex     Abnormal increased muscle tone          Physician Orders: PT Eval and Treat   Medical Diagnosis from Referral: Cervicalgia   Evaluation Date: 9/25/2019  Authorization Period Expiration: 12/31/2019  Plan of Care Expiration: 12/25/2019  Visit # / Visits authorized: 1/ 25    Time In: 4:00pm  Time Out: 5:00  Total Billable Time: 15 minutes    Precautions: Standard    Subjective   Date of onset: 15-20 years has been living with the pain in her neck  History of current condition - Zeina reports: that she was talking to her doctor about the pain in the neck; 3-4 months ago she received Meloxicam and Tramadol from her PCP; she also has received some muscle relaxers that does not help at all. Most of the time it stays in the neck; but sometimes she feels pain in the low back.     She is having migraines 4x per week    She has had numbness and tingling at times, but it goes away when she changes position  The most comfortable position is with a travel pillow on her R side    2005-06 was her last time she feel due to vertigo and dizziness  Motion sickness is something she deals with when she travels         Medical History:   Past Medical History:   Diagnosis Date    Endometriosis     Hypothyroidism     Insomnia     Menopause        Surgical History:   Zeina Estrella  has a past surgical history that includes Hysterectomy.    Medications:   Zeina has a current medication list which includes the following prescription(s): albuterol, armour thyroid, aspirin-acetaminophen-caffeine 250-250-65 mg, engerix-b (pf), ergocalciferol, fluticasone  "propionate, medroxyprogesterone, meloxicam, nortriptyline, spironolactone, terconazole, tramadol, tranexamic acid, typhim vi, and vitamin d2.    Allergies:   Review of patient's allergies indicates:   Allergen Reactions    Erythromycin Shortness Of Breath    Flu vac qs 2017-18(6-35mo)(pf)         Imaging, MRI studies: "1. Scattered degenerative changes of the cervical, thoracic, and lumbar spine detailed above.  2. Small bilateral pleural effusions.  3. 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   Prior Level of Function: independent  Current Level of Function: independent but has trouble driving due to lack of ROM in the neck    Pain:  Current 5/10, worst 8/10, best 4/10   Location: bilateral neck    Description: constant tension that feels like an effort to lift head  Aggravating Factors: Night Time; prevents her from getting to sleep  Easing Factors: hot bath and epson salt , only gives her 1-2 hours of relief    Pts goals: "to get rid of the hump on my neck"     Objective     Observation: Pt wearing sunglasses in waiting room (to prevent migraines)    Posture: extreme thoracic kyphosis, forward head, rounded shoulders    Cervical Range of Motion:    Degrees Pain   Flexion 55 N     Extension 50 N     Right Side Bending 35 Pain in L Upper trap     Left Side Bending 25 Pain in R Upper Trap     Right Rotation 70 N   Left Rotation 55 N      *R Upper trap worse than L     Shoulder Range of Motion:   Shoulder Left Right   Flexion Full but discomfort  Full but discomfort   Abduction Full but discomfort  Full but discomfort    ER Full  Full    IR Full  Full      Upper Extremity Strength  (R) UE  (L) UE    Shoulder elevation: 5/5 Shoulder elevation: 5/5   Shoulder flexion: 4+/5 " Shoulder flexion: 4+/5   Shoulder Abduction: 4/5 Shoulder abduction: 4/5   Shoulder ER 4/5 Shoulder ER 4/5   Shoulder IR 4/5 Shoulder IR 4/5   Elbow flexion: 5/5 Elbow flexion: 5/5   Elbow extension: 5/5 Elbow extension: 5/5   Wrist flexion: 5/5 Wrist flexion: 5/5   Wrist extension: 5/5 Wrist extension: 5/5    5/5 : 5/5   Lower Trap 4-/5 Lower Trap 4-/5   Middle Trap 4-/5 Middle Trap 4-/5   Rhomboids 4-/5 Rhomboids 4-/5     Special Tests:  Distraction Mild relief    Compression -   Spurlings -   Sharp-Ashleigh -   VA test -   Lateral Flexion Alar Ligament -         Upper Limb Neurodynamic testing:   Right Left   UNT - -   MNT - -   RNT - -     Joint Mobility: moderately hypomobile throughout cervical spine    Palpation: no TTP       Sensation: intact     Flexibility: severe restriction in B upper traps      CMS Impairment/Limitation/Restriction for FOTO NOT PERFORMED: based on clinical assessment     Therapist reviewed FOTO scores for Zeina Estrella on 9/25/2019.   FOTO documents entered into [employer] - see Media section.    Limitation Score: 50%  Category: Body Position    Current : CK = at least 40% but < 60% impaired, limited or restricted  Goal: CJ = at least 20% but < 40% impaired, limited or restricted         TREATMENT   Treatment Time In: 445pm  Treatment Time Out: 500pm  Total Treatment time separate from Evaluation: 15 minutes    Zeina received therapeutic exercises to develop strength, endurance and posture for 5 minutes including:  scap retraction with shoulder ER    Zeina received the following manual therapy techniques: Manual traction, Myofacial release and Soft tissue Mobilization were applied to the: cervical neck  for 10 minutes, including:  -STM to upper traps   -suboccipital release   -Myofascial release to posterior cervical paraspinals       Home Exercises and Patient Education Provided    Education provided:   - HEP and postural     Written Home Exercises Provided: yes.  Exercises  were reviewed and Zeina was able to demonstrate them prior to the end of the session.  Zeina demonstrated good  understanding of the education provided.     See EMR under Patient Instructions for exercises provided 9/25/2019.    Assessment   Zeina is a 59 y.o. female referred to outpatient Physical Therapy with a medical diagnosis of cervicalgia. Pt presents with decreased cervical ROM, increased pain, severe thoracic kyphosis, decreased periscapular strength, increased tone in bilateral cervical and thoracic musculature, and postural imbalance consistent with the above diagnosis. Pt received moderate relief of symptoms with stretching and manual techniques performed at time of initial eval.     Pt prognosis is Good.   Pt will benefit from skilled outpatient Physical Therapy to address the deficits stated above and in the chart below, provide pt/family education, and to maximize pt's level of independence.     Plan of care discussed with patient: Yes  Pt's spiritual, cultural and educational needs considered and patient is agreeable to the plan of care and goals as stated below:     Anticipated Barriers for therapy: none    Medical Necessity is demonstrated by the following  History  Co-morbidities and personal factors that may impact the plan of care Co-morbidities:   Depression with anxiety, migraines, Class II obesity     Personal Factors:   no deficits     high   Examination  Body Structures and Functions, activity limitations and participation restrictions that may impact the plan of care Body Regions:   head  neck  upper extremities    Body Systems:    gross symmetry  ROM  strength  gross coordinated movement    Participation Restrictions:   none    Activity limitations:   Learning and applying knowledge  no deficits    General Tasks and Commands  no deficits    Communication  no deficits    Mobility  lifting and carrying objects    Self care  no deficits    Domestic Life  doing house work (cleaning house,  washing dishes, laundry)  no deficits    Interactions/Relationships  no deficits    Life Areas  no deficits    Community and Social Life  no deficits         moderate   Clinical Presentation evolving clinical presentation with changing clinical characteristics moderate   Decision Making/ Complexity Score: moderate     Goals:  Short Term Goals (4 Weeks):   1. Pt will be independent with HEP to supplement PT in improving pain free cervical mobility  2. Pt will improve cervical AROM 10 % in all planes to improve cervical mobility for driving  3. Pt will improve UE MMTs by 1/2 grade in all planes to improve strength for lifting and carrying tasks.  4. Pt will demonstrate improved sitting posture to decrease pain experienced in head and neck.  Long Term Goals (8 Weeks):   1. Pt will improve cervical AROM to WNL in all planes to improve cervical mobility for driving   2. Pt will improve UE MMTs 1 grade in all planes to improve strength for lifting and carrying tasks.  3. Pt will report no pain with lifting 10 lbs to promote physical activity.   4. Pt will report no pain with cervical AROM in all planes to promote QOL.    Plan   Plan of care Certification: 9/25/2019 to 12/25/2019.    Outpatient Physical Therapy 1 times weekly for 8 weeks to include the following interventions: Cervical/Lumbar Traction, Manual Therapy, Moist Heat/ Ice, Patient Education and Therapeutic Exercise.     David Tiwari, PT

## 2019-09-29 NOTE — PROGRESS NOTES
Physical Therapy Daily Treatment Note     Name: Zeina Estrella  Clinic Number: [id number]    Therapy Diagnosis:   Encounter Diagnosis   Name Primary?    Abnormal increased muscle tone Yes     Physician: Thelma De MD PhD    Visit Date: 9/30/2019    Physician Orders: PT Eval and Treat   Medical Diagnosis from Referral: Cervicalgia   Evaluation Date: 9/25/2019  Authorization Period Expiration: 12/31/2019  Plan of Care Expiration: 12/25/2019  Visit # / Visits authorized: 2/ 25  Time In: 4:00pm  Time Out: 5:00pm  Total Billable Time: 60 minutes    Precautions: Standard    Subjective     Pt reports: that she was feeling a lot better after last session. She already feels like she is improving since the initial evaluation.    She was compliant with home exercise program.  Response to previous treatment: improvement in symptoms   Functional change: none    Pain: 4/10  Location: bilateral neck       Objective     Zeina received therapeutic exercises to develop strength, endurance, ROM, flexibility and posture for 45 minutes including:    Supine:  Chin tucks 3x10  Serratus Punches 3x10  Sidelying ER 3x15 B       Sitting:  Horizontal Abduction OTB 3x10  OTB Scaption 2x10 each arm   OTB Shoulder Extension 3x10  OTB Shoulder Rows 3x10        Zeina received the following manual therapy techniques: Myofacial release and Soft tissue Mobilization were applied to the: posterior cervical musculature for 15 minutes, including:  Suboccipital release  STM B Upper Traps       Home Exercises Provided and Patient Education Provided     Education provided:   - HEP     Written Home Exercises Provided: Patient instructed to cont prior HEP.  Exercises were reviewed and Zeina was able to demonstrate them prior to the end of the session.  Zeina demonstrated good  understanding of the education provided.     See EMR under Patient Instructions for exercises provided initial eval.    Assessment     Pt presented to therapy with  decrease in symptoms compared to initial eval day.     Zeina is progressing well towards her goals.   Pt prognosis is Good.     Pt will continue to benefit from skilled outpatient physical therapy to address the deficits listed in the problem list box on initial evaluation, provide pt/family education and to maximize pt's level of independence in the home and community environment.     Pt's spiritual, cultural and educational needs considered and pt agreeable to plan of care and goals.     Anticipated barriers to physical therapy: none    Goals:  Short Term Goals (4 Weeks):   1. Pt will be independent with HEP to supplement PT in improving pain free cervical mobility  2. Pt will improve cervical AROM 10 % in all planes to improve cervical mobility for driving  3. Pt will improve UE MMTs by 1/2 grade in all planes to improve strength for lifting and carrying tasks.  4. Pt will demonstrate improved sitting posture to decrease pain experienced in head and neck.  Long Term Goals (8 Weeks):   1. Pt will improve cervical AROM to WNL in all planes to improve cervical mobility for driving   2. Pt will improve UE MMTs 1 grade in all planes to improve strength for lifting and carrying tasks.  3. Pt will report no pain with lifting 10 lbs to promote physical activity.   4. Pt will report no pain with cervical AROM in all planes to promote QOL.    Plan     Cont POC, progress as tolerated     David Tiwari, PT

## 2019-09-30 ENCOUNTER — CLINICAL SUPPORT (OUTPATIENT)
Dept: REHABILITATION | Facility: HOSPITAL | Age: 59
End: 2019-09-30
Attending: PSYCHIATRY & NEUROLOGY
Payer: COMMERCIAL

## 2019-09-30 DIAGNOSIS — M62.89 ABNORMAL INCREASED MUSCLE TONE: Primary | ICD-10-CM

## 2019-09-30 PROCEDURE — 97110 THERAPEUTIC EXERCISES: CPT | Mod: PO

## 2019-09-30 PROCEDURE — 97140 MANUAL THERAPY 1/> REGIONS: CPT | Mod: PO

## 2019-10-07 ENCOUNTER — CLINICAL SUPPORT (OUTPATIENT)
Dept: REHABILITATION | Facility: HOSPITAL | Age: 59
End: 2019-10-07
Attending: PSYCHIATRY & NEUROLOGY
Payer: COMMERCIAL

## 2019-10-07 DIAGNOSIS — M62.89 ABNORMAL INCREASED MUSCLE TONE: Primary | ICD-10-CM

## 2019-10-07 PROCEDURE — 97110 THERAPEUTIC EXERCISES: CPT | Mod: PO

## 2019-10-07 PROCEDURE — 97140 MANUAL THERAPY 1/> REGIONS: CPT | Mod: PO

## 2019-10-07 NOTE — PROGRESS NOTES
Physical Therapy Daily Treatment Note     Name: Zeina Estrella  Clinic Number: [id number]    Therapy Diagnosis:   Encounter Diagnosis   Name Primary?    Abnormal increased muscle tone Yes     Physician: Thelma De MD PhD    Visit Date: 10/7/2019    Physician Orders: PT Eval and Treat   Medical Diagnosis from Referral: Cervicalgia   Evaluation Date: 9/25/2019  Authorization Period Expiration: 12/31/2019  Plan of Care Expiration: 12/25/2019  Visit # / Visits authorized: 3/ 25  Time In: 4:00pm  Time Out: 5:00pm  Total Billable Time: 60  minutes    Precautions: Standard    Subjective     Pt reports: that she is having a little discomfort on the R side today, but it is not too bad     She was compliant with home exercise program; this last week she did less because she was very busy.   Response to previous treatment: improvement in symptoms   Functional change: none    Pain: 4/10  Location: bilateral neck       Objective     Zeina received therapeutic exercises to develop strength, endurance, ROM, flexibility and posture for 50 minutes including:    Supine:  -Chin tucks 3x10  -Serratus Punches 3x10  -Sidelying ER 3x15  B     Prone:   - Ys 2x10 each   - Rows 2x10 2# each   - Shoulder Extension 2# 2x10 each     Sitting:  -UBE 4 min fwd 4 min back   -Horizontal Abduction OTB 3x10  -OTB Scaption 2x10 each arm      Standing:   Ball Wall Circles Green ball 2x20 each arm each direction          Zeina received the following manual therapy techniques: Myofacial release and Soft tissue Mobilization were applied to the: posterior cervical musculature for 10 minutes, including:  Suboccipital release  STM B Upper Traps   STM Rhomboids  Scapular mobilizations Sidelying       Home Exercises Provided and Patient Education Provided     Education provided:   - HEP     Written Home Exercises Provided: Patient instructed to cont prior HEP.  Exercises were reviewed and Zeina was able to demonstrate them prior to the end of  the session.  Zeina demonstrated good  understanding of the education provided.     See EMR under Patient Instructions for exercises provided initial eval.    Assessment     Pt presented to therapy with improved tolerance to activities. Pt's endurance was much improved since last session not needing rest breaks as often. Pt also displayed decreased muscular tone in B upper traps.     Zeina is progressing well towards her goals.   Pt prognosis is Good.     Pt will continue to benefit from skilled outpatient physical therapy to address the deficits listed in the problem list box on initial evaluation, provide pt/family education and to maximize pt's level of independence in the home and community environment.     Pt's spiritual, cultural and educational needs considered and pt agreeable to plan of care and goals.     Anticipated barriers to physical therapy: none    Goals:  Short Term Goals (4 Weeks):   1. Pt will be independent with HEP to supplement PT in improving pain free cervical mobility  2. Pt will improve cervical AROM 10 % in all planes to improve cervical mobility for driving  3. Pt will improve UE MMTs by 1/2 grade in all planes to improve strength for lifting and carrying tasks.  4. Pt will demonstrate improved sitting posture to decrease pain experienced in head and neck.  Long Term Goals (8 Weeks):   1. Pt will improve cervical AROM to WNL in all planes to improve cervical mobility for driving   2. Pt will improve UE MMTs 1 grade in all planes to improve strength for lifting and carrying tasks.  3. Pt will report no pain with lifting 10 lbs to promote physical activity.   4. Pt will report no pain with cervical AROM in all planes to promote QOL.    Plan     Cont POC, progress as tolerated     David Tiwari, PT

## 2019-10-26 ENCOUNTER — PATIENT MESSAGE (OUTPATIENT)
Dept: NEUROLOGY | Facility: HOSPITAL | Age: 59
End: 2019-10-26

## 2019-10-26 DIAGNOSIS — G43.009 MIGRAINE WITHOUT AURA AND WITHOUT STATUS MIGRAINOSUS, NOT INTRACTABLE: Primary | ICD-10-CM

## 2019-10-28 ENCOUNTER — CLINICAL SUPPORT (OUTPATIENT)
Dept: REHABILITATION | Facility: HOSPITAL | Age: 59
End: 2019-10-28
Attending: PSYCHIATRY & NEUROLOGY
Payer: COMMERCIAL

## 2019-10-28 DIAGNOSIS — M62.89 ABNORMAL INCREASED MUSCLE TONE: Primary | ICD-10-CM

## 2019-10-28 PROCEDURE — 97140 MANUAL THERAPY 1/> REGIONS: CPT | Mod: PO

## 2019-10-28 PROCEDURE — 97110 THERAPEUTIC EXERCISES: CPT | Mod: PO

## 2019-10-28 NOTE — PROGRESS NOTES
Physical Therapy Daily Treatment Note     Name: Zeina Urbano  Clinic Number: [id number]    Therapy Diagnosis:   Encounter Diagnosis   Name Primary?    Abnormal increased muscle tone Yes     Physician: Thelma De MD PhD    Visit Date: 10/28/2019    Physician Orders: PT Eval and Treat   Medical Diagnosis from Referral: Cervicalgia   Evaluation Date: 9/25/2019  Authorization Period Expiration: 12/31/2019  Plan of Care Expiration: 12/25/2019  Visit # / Visits authorized: 4/ 25  Time In: 4:00pm  Time Out: 5:00pm  Total Billable Time: 60  minutes    Precautions: Standard    Subjective     Pt reports: that she had a rough weekend, she had a migraine and was having vertigo symptoms. Pt also apologized for missing last week, she completely forgot.     She was compliant with home exercise program; this last week she did less because she was very busy.   Response to previous treatment: improvement in symptoms   Functional change: none    Pain: 4/10  Location: bilateral neck       Objective     Zeina received therapeutic exercises to develop strength, endurance, ROM, flexibility and posture for 50 minutes including:    Supine:  -Chin tucks 3x10  -Serratus Punches 3x10 at 120 degrees flexion  -Sidelying ER 3x15  B     Prone:   - Ys 2x10 each   - Rows 2x10 2# each   - Shoulder Extension 2# 2x10 each     Sitting:  -UBE 4 min fwd 4 min back   -Horizontal Abduction OTB 3x10  -OTB Scaption 2x10 each arm      Standing:   Ball Wall Circles Green ball 2x20 each arm each direction  - Wall Clocks OTB 4 rounds each arm       Zeina received the following manual therapy techniques: Myofacial release and Soft tissue Mobilization were applied to the: posterior cervical musculature for 10 minutes, including:  Suboccipital release  STM B Upper Traps   STM Rhomboids  Scapular mobilizations Sidelying       Home Exercises Provided and Patient Education Provided     Education provided:   - HEP     Written Home Exercises Provided:  Patient instructed to cont prior HEP.  Exercises were reviewed and Zeina was able to demonstrate them prior to the end of the session.  Zeina demonstrated good  understanding of the education provided.     See EMR under Patient Instructions for exercises provided initial eval.    Assessment     Pt presented to therapy with increase in symptoms since last visit. Pt tolerated all exercises well and felt like she was leaving with less pain than she came in with.      Zeina is progressing well towards her goals.   Pt prognosis is Good.     Pt will continue to benefit from skilled outpatient physical therapy to address the deficits listed in the problem list box on initial evaluation, provide pt/family education and to maximize pt's level of independence in the home and community environment.     Pt's spiritual, cultural and educational needs considered and pt agreeable to plan of care and goals.     Anticipated barriers to physical therapy: none    Goals:  Short Term Goals (4 Weeks):   1. Pt will be independent with HEP to supplement PT in improving pain free cervical mobility  2. Pt will improve cervical AROM 10 % in all planes to improve cervical mobility for driving  3. Pt will improve UE MMTs by 1/2 grade in all planes to improve strength for lifting and carrying tasks.  4. Pt will demonstrate improved sitting posture to decrease pain experienced in head and neck.  Long Term Goals (8 Weeks):   1. Pt will improve cervical AROM to WNL in all planes to improve cervical mobility for driving   2. Pt will improve UE MMTs 1 grade in all planes to improve strength for lifting and carrying tasks.  3. Pt will report no pain with lifting 10 lbs to promote physical activity.   4. Pt will report no pain with cervical AROM in all planes to promote QOL.    Plan     Cont POC, progress as tolerated; reassess next visit.     David Tiwari, PT

## 2019-11-06 ENCOUNTER — CLINICAL SUPPORT (OUTPATIENT)
Dept: REHABILITATION | Facility: HOSPITAL | Age: 59
End: 2019-11-06
Attending: PSYCHIATRY & NEUROLOGY
Payer: COMMERCIAL

## 2019-11-06 DIAGNOSIS — M62.89 ABNORMAL INCREASED MUSCLE TONE: Primary | ICD-10-CM

## 2019-11-06 PROCEDURE — 97110 THERAPEUTIC EXERCISES: CPT | Mod: PO

## 2019-11-06 NOTE — PROGRESS NOTES
"  Physical Therapy Daily Treatment Note/REASSESSMENT     Name: Zeina Estrella  Clinic Number: [id number]    Therapy Diagnosis:   Encounter Diagnosis   Name Primary?    Abnormal increased muscle tone Yes     Physician: Thelma De MD PhD    Visit Date: 11/6/2019    Physician Orders: PT Eval and Treat   Medical Diagnosis from Referral: Cervicalgia   Evaluation Date: 9/25/2019  Authorization Period Expiration: 12/31/2019  Plan of Care Expiration: 12/25/2019  Visit # / Visits authorized: 5/ 25  Time In: 4:00pm  Time Out: 4:45pm  Total Billable Time: 25  minutes    Precautions: Standard    Subjective     Pt reports: that she had a few headaches lately, but otherwise is doing okay. She states that "I feel more than 50% better"     She was compliant with home exercise program; this last week she did less because she was very busy.   Response to previous treatment: improvement in symptoms   Functional change: none    Pain: 4/10  Location: bilateral neck       Objective      Observation: Pt wearing sunglasses in waiting room (to prevent migraines)     Posture: extreme thoracic kyphosis, forward head, rounded shoulders     Cervical Range of Motion:     Degrees Pain   Flexion 55 N      Extension 50 N      Right Side Bending 40 Pain in L Upper trap      Left Side Bending 30 Pain in R Upper Trap      Right Rotation 70 N   Left Rotation 60 N      *R Upper trap worse than L      Shoulder Range of Motion:   Shoulder Left Right   Flexion Full NO discomfort  Full NO discomfort   Abduction Full  Full   ER Full  Full    IR Full  Full       Upper Extremity Strength  (R) UE   (L) UE     Shoulder elevation: 5/5 Shoulder elevation: 5/5   Shoulder flexion: 4+/5 Shoulder flexion: 4+/5   Shoulder Abduction: 4+/5 Shoulder abduction: 4+/5   Shoulder ER 4+/5 Shoulder ER 4+/5   Shoulder IR 4+/5 Shoulder IR 4+/5   Elbow flexion: 5/5 Elbow flexion: 5/5   Elbow extension: 5/5 Elbow extension: 5/5   Wrist flexion: 5/5 Wrist flexion: 5/5 "   Wrist extension: 5/5 Wrist extension: 5/5    5/5 : 5/5   Lower Trap 4-/5 Lower Trap 4-/5   Middle Trap 4-/5 Middle Trap 4-/5   Rhomboids 4-/5 Rhomboids 4-/5      Special Tests:  Distraction Mild relief    Compression -   Spurlings -   Sharp-Ashleigh -   VA test -   Lateral Flexion Alar Ligament -            Upper Limb Neurodynamic testing:    Right Left   UNT - -   MNT - -   RNT - -      Joint Mobility: moderately hypomobile throughout cervical spine     Palpation: no TTP        Sensation: intact      Flexibility: severe restriction in B upper traps      Zeina received therapeutic exercises to develop strength, endurance, ROM, flexibility and posture for 50 minutes including:    Supine:  -Chin tucks 3x10  -Serratus Punches 3x10   -Sidelying ER 3x15  B     Prone:   - Ys 2x10 each   - Rows 2x10 2# each   - Shoulder Extension 2# 2x10 each     Sitting:  -UBE 4 min fwd 4 min back lv 1.5  -Horizontal Abduction GTB 3x10  -OTB Scaption 2x10 each arm      Standing:   Ball Wall Circles red ball 3x20 each arm each direction  - Wall Clocks OTB 4 rounds each arm   - Rows GTB 3x10  - Extension GTB 3x10      Zeina received the following manual therapy techniques: Myofacial release and Soft tissue Mobilization were applied to the: posterior cervical musculature for 0 minutes, including:  Suboccipital release  STM B Upper Traps   STM Rhomboids  Scapular mobilizations Sidelying       Home Exercises Provided and Patient Education Provided     Education provided:   - HEP     Written Home Exercises Provided: Patient instructed to cont prior HEP.  Exercises were reviewed and Zeina was able to demonstrate them prior to the end of the session.  Zeina demonstrated good  understanding of the education provided.     See EMR under Patient Instructions for exercises provided initial eval.    Assessment     Pt presents to reassessment with significant increase in periscapular strength and decreased symptoms. Pt feels that she can  maintain her current level of function at home, and also believes that she has overall improved her ability to control her own symptoms. PT and pt discussed and agreed on one more follow up in December to reassess/discharge at that time.     Zeina is progressing well towards her goals.   Pt prognosis is Good.     Pt will continue to benefit from skilled outpatient physical therapy to address the deficits listed in the problem list box on initial evaluation, provide pt/family education and to maximize pt's level of independence in the home and community environment.     Pt's spiritual, cultural and educational needs considered and pt agreeable to plan of care and goals.     Anticipated barriers to physical therapy: none    Goals:  Short Term Goals (4 Weeks):   1. Pt will be independent with HEP to supplement PT in improving pain free cervical mobility (MET 11/6)  2. Pt will improve cervical AROM 10 % in all planes to improve cervical mobility for driving   3. Pt will improve UE MMTs by 1/2 grade in all planes to improve strength for lifting and carrying tasks. (MET 11/6)  4. Pt will demonstrate improved sitting posture to decrease pain experienced in head and neck.(MET 11/6)  Long Term Goals (8 Weeks):   1. Pt will improve cervical AROM to WNL in all planes to improve cervical mobility for driving   2. Pt will improve UE MMTs 1 grade in all planes to improve strength for lifting and carrying tasks.(MET 11/6)  3. Pt will report no pain with lifting 10 lbs to promote physical activity. (MET 11/6)  4. Pt will report no pain with cervical AROM in all planes to promote QOL.    Plan     Plan is to meet one more time in a few weeks to reassess and evaluate if the ot can maintain current regain of function at home     David Tiwari, PT

## 2019-11-07 RX ORDER — NORTRIPTYLINE HYDROCHLORIDE 50 MG/1
50 CAPSULE ORAL NIGHTLY
Qty: 90 CAPSULE | Refills: 3 | Status: SHIPPED | OUTPATIENT
Start: 2019-11-07 | End: 2020-02-26 | Stop reason: SDUPTHER

## 2019-11-07 NOTE — TELEPHONE ENCOUNTER
nortriptyline 50mg nightly    Thelma De MD PhD  Neurology-Epilepsy  [facility].  [facility]

## 2019-11-14 ENCOUNTER — OFFICE VISIT (OUTPATIENT)
Dept: NEUROLOGY | Facility: CLINIC | Age: 59
End: 2019-11-14
Payer: COMMERCIAL

## 2019-11-14 VITALS
TEMPERATURE: 98 F | HEIGHT: 61 IN | BODY MASS INDEX: 37.42 KG/M2 | DIASTOLIC BLOOD PRESSURE: 80 MMHG | SYSTOLIC BLOOD PRESSURE: 127 MMHG | WEIGHT: 198.19 LBS | HEART RATE: 97 BPM

## 2019-11-14 DIAGNOSIS — G43.009 MIGRAINE WITHOUT AURA AND WITHOUT STATUS MIGRAINOSUS, NOT INTRACTABLE: Primary | ICD-10-CM

## 2019-11-14 DIAGNOSIS — M62.838 MUSCLE SPASM OF SHOULDER REGION: ICD-10-CM

## 2019-11-14 DIAGNOSIS — F41.8 DEPRESSION WITH ANXIETY: ICD-10-CM

## 2019-11-14 DIAGNOSIS — M54.2 CERVICALGIA: ICD-10-CM

## 2019-11-14 PROBLEM — M62.89 ABNORMAL INCREASED MUSCLE TONE: Status: RESOLVED | Noted: 2019-09-26 | Resolved: 2019-11-14

## 2019-11-14 PROCEDURE — 99999 PR PBB SHADOW E&M-EST. PATIENT-LVL III: CPT | Mod: PBBFAC,,, | Performed by: PSYCHIATRY & NEUROLOGY

## 2019-11-14 PROCEDURE — 99214 PR OFFICE/OUTPT VISIT, EST, LEVL IV, 30-39 MIN: ICD-10-PCS | Mod: S$GLB,,, | Performed by: PSYCHIATRY & NEUROLOGY

## 2019-11-14 PROCEDURE — 99214 OFFICE O/P EST MOD 30 MIN: CPT | Mod: S$GLB,,, | Performed by: PSYCHIATRY & NEUROLOGY

## 2019-11-14 PROCEDURE — 99999 PR PBB SHADOW E&M-EST. PATIENT-LVL III: ICD-10-PCS | Mod: PBBFAC,,, | Performed by: PSYCHIATRY & NEUROLOGY

## 2019-11-14 RX ORDER — TIZANIDINE 4 MG/1
4 TABLET ORAL NIGHTLY
Qty: 30 TABLET | Refills: 5 | Status: SHIPPED | OUTPATIENT
Start: 2019-11-14 | End: 2020-05-12

## 2019-11-14 NOTE — PATIENT INSTRUCTIONS
Soma for a proper fitting to make sure you are wearing the right size bra.   Trumedic neck massager    Consider an inversion table   Look at different soft collars (some are inflatable) online   Tizanidine 4mg at night, okay to use every night but it will make you sleepy. Do not drive on this medication.   Continue the nortriptyline 50mg at night  Continue to support your neck   Wear supportive shoes with good soles   If you feel the migraine coming on, take the sumatriptan right away, at the very start of symptoms   Take 25mg, if you still have a headache after an hour, take another pill. Once you try this a couple of times, as long as you have no major side effects to the medication, you can take 50mg at pain onset and another 50mg if you need it an hour later.   Please continue to exercise, get in the pool to take the weight off your back and to work on strength and flexibility   Come back to clinic 3-6 months, bring your headache journal next time.     Thelma De MD PhD  Neurology-Epilepsy  [facility].  [facility]
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
## 2019-12-03 ENCOUNTER — CLINICAL SUPPORT (OUTPATIENT)
Dept: REHABILITATION | Facility: HOSPITAL | Age: 59
End: 2019-12-03
Attending: PSYCHIATRY & NEUROLOGY
Payer: COMMERCIAL

## 2019-12-03 DIAGNOSIS — M54.2 CERVICALGIA: ICD-10-CM

## 2019-12-03 PROCEDURE — 97110 THERAPEUTIC EXERCISES: CPT | Mod: PO

## 2019-12-03 NOTE — PROGRESS NOTES
Physical Therapy Discharge Note     Name: Zeina Estrella  Clinic Number: [id number]    Therapy Diagnosis:   Encounter Diagnosis   Name Primary?    Cervicalgia      Physician: Thelma De MD PhD    Visit Date: 12/3/2019    Physician Orders: PT Eval and Treat   Medical Diagnosis from Referral: Cervicalgia   Evaluation Date: 9/25/2019  Authorization Period Expiration: 12/31/2019  Plan of Care Expiration: 12/25/2019  Visit # / Visits authorized: 6/ 25  Time In: 4:00pm  Time Out: 4:46pm  Total Billable Time: 23  Minutes (2TE)    Precautions: Standard    Subjective     Pt reports: that she did fine while maintaining her current level of function at home, and that her headaches have been much less frequent and manageable in terms of strength. Pt states that she would like to be discharged from physical therapy and she plans on returning to the gym and continuing the exercises that we have been doing in the clinic.     She was compliant with home exercise program; this last week she did less because she was very busy.   Response to previous treatment: improvement in symptoms   Functional change: none    Pain: 0/10  Location: bilateral neck       Objective      Cervical Range of Motion:     Degrees Pain   Flexion 55 N      Extension 50 N      Right Side Bending 40 Pain in L Upper trap      Left Side Bending 30 Pain in R Upper Trap      Right Rotation 70 N   Left Rotation 60 N           Shoulder Range of Motion:   Shoulder Left Right   Flexion Full NO discomfort  Full NO discomfort   Abduction Full  Full   ER Full  Full    IR Full  Full       Upper Extremity Strength  (R) UE   (L) UE     Shoulder elevation: 5/5 Shoulder elevation: 5/5   Shoulder flexion: 5/5 Shoulder flexion: 5/5   Shoulder Abduction: 5/5 Shoulder abduction: 5/5   Shoulder ER 5/5 Shoulder ER 5/5   Shoulder IR 5/5 Shoulder IR 5/5   Elbow flexion: 5/5 Elbow flexion: 5/5   Elbow extension: 5/5 Elbow extension: 5/5   Wrist flexion: 5/5 Wrist flexion:  5/5   Wrist extension: 5/5 Wrist extension: 5/5    5/5 : 5/5   Lower Trap 4-/5 Lower Trap 4-/5   Middle Trap 4-/5 Middle Trap 4-/5   Rhomboids 4-/5 Rhomboids 4-/5      Special Tests:  Distraction Mild relief    Compression -   Spurlings -   Sharp-Ashleigh -   VA test -   Lateral Flexion Alar Ligament -            Upper Limb Neurodynamic testing:    Right Left   UNT - -   MNT - -   RNT - -      Joint Mobility: moderately hypomobile throughout cervical spine     Palpation: no TTP        Sensation: intact      Flexibility: min-moderate restriction in B upper traps      Zeina received therapeutic exercises to develop strength, endurance, ROM, flexibility and posture for 50 minutes including:    Supine:  -Chin tucks 3x10  -Serratus Punches 3x10   -Sidelying ER 3x15  B     Prone:   - Ys 2x10 each   - Rows 2x10 2# each   - Shoulder Extension 2# 2x10 each     Sitting:  -UBE 4 min fwd 4 min back lv 1.5  -Horizontal Abduction GTB 3x10  -OTB Scaption 2x10 each arm      Standing:   Ball Wall Circles red ball 3x20 each arm each direction  - Wall Clocks OTB 4 rounds each arm   - Rows GTB 3x10  - Extension GTB 3x10      Zeina received the following manual therapy techniques: Myofacial release and Soft tissue Mobilization were applied to the: posterior cervical musculature for 0 minutes, including:  Suboccipital release  STM B Upper Traps   STM Rhomboids  Scapular mobilizations Sidelying       Home Exercises Provided and Patient Education Provided     Education provided:   - HEP     Written Home Exercises Provided: Patient instructed to cont prior HEP.  Exercises were reviewed and Zeina was able to demonstrate them prior to the end of the session.  Zeina demonstrated good  understanding of the education provided.     See EMR under Patient Instructions for exercises provided initial eval.    Assessment     Pt presents to discharge day with continued improvements in function and ready for discharge due to meeting all goals  and self report of significant improvements in symptoms. Pt also shows complete independence in HEP with no need for verbal or tactile cueing.     Zeina is progressing well towards her goals.   Pt prognosis is Good.     Pt will continue to benefit from skilled outpatient physical therapy to address the deficits listed in the problem list box on initial evaluation, provide pt/family education and to maximize pt's level of independence in the home and community environment.     Pt's spiritual, cultural and educational needs considered and pt agreeable to plan of care and goals.     Anticipated barriers to physical therapy: none    Goals:  Short Term Goals (4 Weeks):   1. Pt will be independent with HEP to supplement PT in improving pain free cervical mobility (MET 11/6)  2. Pt will improve cervical AROM 10 % in all planes to improve cervical mobility for driving (MET 12/3)  3. Pt will improve UE MMTs by 1/2 grade in all planes to improve strength for lifting and carrying tasks. (MET 11/6)  4. Pt will demonstrate improved sitting posture to decrease pain experienced in head and neck.(MET 11/6)  Long Term Goals (8 Weeks):   1. Pt will improve cervical AROM to WNL in all planes to improve cervical mobility for driving (MET 12/3)  2. Pt will improve UE MMTs 1 grade in all planes to improve strength for lifting and carrying tasks.(MET 11/6)  3. Pt will report no pain with lifting 10 lbs to promote physical activity. (MET 11/6)  4. Pt will report no pain with cervical AROM in all planes to promote QOL. (MET 12/3)    Plan     Pt to be discharged from physical therapy at this time due to the accomplishment of all goals     David Tiwari, PT

## 2020-01-07 ENCOUNTER — PATIENT MESSAGE (OUTPATIENT)
Dept: NEUROLOGY | Facility: CLINIC | Age: 60
End: 2020-01-07

## 2020-01-14 ENCOUNTER — PATIENT MESSAGE (OUTPATIENT)
Dept: NEUROLOGY | Facility: CLINIC | Age: 60
End: 2020-01-14

## 2020-02-17 ENCOUNTER — OFFICE VISIT (OUTPATIENT)
Dept: URGENT CARE | Facility: CLINIC | Age: 60
End: 2020-02-17
Payer: COMMERCIAL

## 2020-02-17 VITALS
HEIGHT: 61 IN | WEIGHT: 198 LBS | DIASTOLIC BLOOD PRESSURE: 94 MMHG | HEART RATE: 64 BPM | BODY MASS INDEX: 37.38 KG/M2 | OXYGEN SATURATION: 100 % | TEMPERATURE: 99 F | RESPIRATION RATE: 19 BRPM | SYSTOLIC BLOOD PRESSURE: 123 MMHG

## 2020-02-17 DIAGNOSIS — R52 BODY ACHES: ICD-10-CM

## 2020-02-17 DIAGNOSIS — B34.9 VIRAL SYNDROME: ICD-10-CM

## 2020-02-17 DIAGNOSIS — R42 VERTIGO: ICD-10-CM

## 2020-02-17 DIAGNOSIS — H66.92 LEFT OTITIS MEDIA, UNSPECIFIED OTITIS MEDIA TYPE: Primary | ICD-10-CM

## 2020-02-17 LAB
CTP QC/QA: YES
FLUAV AG NPH QL: NEGATIVE
FLUBV AG NPH QL: NEGATIVE

## 2020-02-17 PROCEDURE — 87804 INFLUENZA ASSAY W/OPTIC: CPT | Mod: QW,S$GLB,, | Performed by: PHYSICIAN ASSISTANT

## 2020-02-17 PROCEDURE — 99214 OFFICE O/P EST MOD 30 MIN: CPT | Mod: 25,S$GLB,, | Performed by: PHYSICIAN ASSISTANT

## 2020-02-17 PROCEDURE — 99214 PR OFFICE/OUTPT VISIT, EST, LEVL IV, 30-39 MIN: ICD-10-PCS | Mod: 25,S$GLB,, | Performed by: PHYSICIAN ASSISTANT

## 2020-02-17 PROCEDURE — 87804 POCT INFLUENZA A/B: ICD-10-PCS | Mod: QW,S$GLB,, | Performed by: PHYSICIAN ASSISTANT

## 2020-02-17 RX ORDER — AMOXICILLIN AND CLAVULANATE POTASSIUM 875; 125 MG/1; MG/1
1 TABLET, FILM COATED ORAL 2 TIMES DAILY
Qty: 14 TABLET | Refills: 0 | Status: SHIPPED | OUTPATIENT
Start: 2020-02-17 | End: 2020-02-17

## 2020-02-17 RX ORDER — MECLIZINE HYDROCHLORIDE 25 MG/1
25 TABLET ORAL
Status: COMPLETED | OUTPATIENT
Start: 2020-02-17 | End: 2020-02-17

## 2020-02-17 RX ORDER — AMOXICILLIN AND CLAVULANATE POTASSIUM 875; 125 MG/1; MG/1
1 TABLET, FILM COATED ORAL 2 TIMES DAILY
Qty: 20 TABLET | Refills: 0 | Status: SHIPPED | OUTPATIENT
Start: 2020-02-17 | End: 2020-02-27

## 2020-02-17 RX ORDER — MECLIZINE HYDROCHLORIDE 25 MG/1
25 TABLET ORAL 3 TIMES DAILY PRN
Qty: 20 TABLET | Refills: 0 | Status: SHIPPED | OUTPATIENT
Start: 2020-02-17 | End: 2020-02-24

## 2020-02-17 RX ADMIN — MECLIZINE HYDROCHLORIDE 25 MG: 25 TABLET ORAL at 07:02

## 2020-02-18 NOTE — PATIENT INSTRUCTIONS
Fluids  Rest  Meclizine as needed for vertigo  augmentin     Monitor temperatures    Please go to the emergency department for any changes, worsening, new or concerning symptoms, chest pain or shortness of breath    Please return here or go to the Emergency Department for any concerns or worsening of condition.  If you were prescribed antibiotics, please take them to completion.  If you were prescribed a narcotic medication, do not drive or operate heavy equipment or machinery while taking these medications.  Please follow up with your primary care doctor or specialist as needed.    If you  smoke, please stop smoking.        Vertigo (Unknown Cause)    In addition to helping with hearing, the inner ear is part of the balance center of your body. Problems with the inner ear can a false feeling of motion. This is called vertigo. Often, it feels as if you or the room is spinning. A vertigo attack may cause sudden nausea, vomiting and heavy sweating. Severe vertigo causes a loss of balance and can cause you to fall. During vertigo, small head movements and changes in body position will often make the symptoms worse. You may also have ringing in the ears called tinnitus.  An episode of vertigo may last seconds, minutes or hours. Once you are over the first episode, it may never come back. However, symptoms may return off and on.  The cause of your vertigo is not yet known. Possible causes of vertigo include:  · Inflammation of the inner ear  · Disease of the nerves to the inner ear  · Movement of calcium particles in the inner ear  · Poor blood flow to the balance centers of the brain  · Migraine headaches  Home care  · If symptoms are severe, rest quietly in bed. Change positions very slowly. There is usually one position that will feel best, such as lying on one side or lying on your back with your head slightly raised on pillows.  · Do not drive a car or work with dangerous machinery until symptoms have been gone for  at least one week.  · Take medicine as prescribed to relieve your symptoms. Unless another medicine was prescribed for symptoms of nausea, vomiting, and dizziness, you may use over-the-counter motion sickness pills. Ask your pharmacist for suggestions.  Follow-up care  Follow up with your healthcare provider or as directed. If you are referred to a specialist or for testing, make the appointment promptly.  When to seek medical advice  Call your healthcare provider if any of the following occur:  · Fever of 100.4°F (38ºC) or higher, or as directed by your healthcare provider  · Vertigo worsens or is not controlled by prescribed medicine   · Repeated vomiting not relieved by prescribed medicine   · Severe headache  · Confusion  · Weakness of an arm or leg or one side of the face  · Difficulty with speech or vision  · Loss of consciousness   · Seizure  Date Last Reviewed: 8/16/2015 © 2000-2017 [employer]. [address] [place] [address]. All rights reserved. This information is not intended as a substitute for professional medical care. Always follow your healthcare professional's instructions.        Middle Ear Infection (Adult)  You have an infection of the middle ear, the space behind the eardrum. This is also called acute otitis media (AOM). Sometimes it is caused by the common cold. This is because congestion can block the internal passage (eustachian tube) that drains fluid from the middle ear. When the middle ear fills with fluid, bacteria can grow there and cause an infection. Oral antibiotics are used to treat this illness, not ear drops. Symptoms usually start to improve within 1 to 2 days of treatment.    Home care  The following are general care guidelines:  · Finish all of the antibiotic medicine given, even though you may feel better after the first few days.  · You may use over-the-counter medicine, such as acetaminophen or ibuprofen, to control pain and fever, unless something  else was prescribed. If you have chronic liver or kidney disease or have ever had a stomach ulcer or gastrointestinal bleeding, talk with your healthcare provider before using these medicines. Do not give aspirin to anyone under 18 years of age who has a fever. It may cause severe illness or death.  Follow-up care  Follow up with your healthcare provider, or as advised, in 2 weeks if all symptoms have not gotten better, or if hearing doesn't go back to normal within 1 month.  When to seek medical advice  Call your healthcare provider right away if any of these occur:  · Ear pain gets worse or does not improve after 3 days of treatment  · Unusual drowsiness or confusion  · Neck pain, stiff neck, or headache  · Fluid or blood draining from the ear canal  · Fever of 100.4°F (38°C) or as advised   · Seizure  Date Last Reviewed: 6/1/2016  © 9668-3498 [employer]. [address] [place] [address], [place] [address]. All rights reserved. This information is not intended as a substitute for professional medical care. Always follow your healthcare professional's instructions.

## 2020-02-18 NOTE — PROGRESS NOTES
"Subjective:       Patient ID: Zeina Estrella is a 59 y.o. female.    Vitals:  height is 5' 1" (1.549 m) and weight is 89.8 kg (198 lb). Her oral temperature is 99.1 °F (37.3 °C). Her blood pressure is 123/94 (abnormal) and her pulse is 64. Her respiration is 19 and oxygen saturation is 100%.     Chief Complaint: Fatigue    This is a 59 yr old female who presents c/o generalized weakness onset yesterday. States that yesterday she had diarrhea throughout the day, watery and brown. Also reports she onset yesterday with generalized weakness, body aches, felt feverish, cough, sore throat, sinus congestion and left ear pain. Reports that she also is experiencing room spinning sensation when moving her head. She has a history of frequent and recurrent vertigo in the past , questionable hx of meniere's disease as well and states this feels similar to regular flare up. She also reports this feel similar to early onset of the flu.     She denies any chest pain, vomiting, unilateral weakness, headache, changes in vision, blurred vision, facial drooping, confusion, altered speech. She states she feels mild shortness of breath which she states is secondary to her asthma and chest congestion.       Fatigue   This is a new problem. The current episode started yesterday. The problem occurs constantly. The problem has been unchanged. Associated symptoms include fatigue and a fever (subjective). Pertinent negatives include no abdominal pain, anorexia, arthralgias, change in bowel habit, chest pain, chills, congestion, coughing, diaphoresis, headaches, joint swelling, myalgias, nausea, neck pain, numbness, rash, sore throat, swollen glands, urinary symptoms, vertigo, visual change, vomiting or weakness. Treatments tried: Potassium, Pediacare. The treatment provided mild relief.       Constitution: Positive for fatigue and fever (subjective). Negative for chills and sweating.   HENT: Positive for ear pain. Negative for congestion " and sore throat.    Neck: Negative for neck pain and painful lymph nodes.   Cardiovascular: Negative for chest pain and leg swelling.   Eyes: Negative for double vision and blurred vision.   Respiratory: Negative for cough and shortness of breath.    Gastrointestinal: Negative for abdominal pain, nausea, vomiting and diarrhea.   Genitourinary: Negative for dysuria, frequency, urgency and history of kidney stones.   Musculoskeletal: Negative for joint pain, joint swelling, muscle cramps and muscle ache.   Skin: Negative for color change, pale, rash and bruising.   Allergic/Immunologic: Negative for seasonal allergies.   Neurological: Negative for dizziness, history of vertigo, light-headedness, passing out, headaches and numbness.   Hematologic/Lymphatic: Negative for swollen lymph nodes.   Psychiatric/Behavioral: Negative for nervous/anxious, sleep disturbance and depression. The patient is not nervous/anxious.        Objective:      Physical Exam   Constitutional: She is oriented to person, place, and time. She appears well-developed and well-nourished. She is cooperative.  Non-toxic appearance. She does not have a sickly appearance. She does not appear ill. No distress.   HENT:   Head: Normocephalic and atraumatic.   Right Ear: Hearing, external ear and ear canal normal. Tympanic membrane is bulging.   Left Ear: Hearing, external ear and ear canal normal. Tympanic membrane is bulging. A middle ear effusion (yellow) is present.   Nose: Mucosal edema and rhinorrhea present. No nasal deformity. No epistaxis. Right sinus exhibits no maxillary sinus tenderness and no frontal sinus tenderness. Left sinus exhibits no maxillary sinus tenderness and no frontal sinus tenderness.   Mouth/Throat: Uvula is midline, oropharynx is clear and moist and mucous membranes are normal. No trismus in the jaw. Normal dentition. No uvula swelling. No oropharyngeal exudate, posterior oropharyngeal edema or posterior oropharyngeal erythema.    Eyes: Pupils are equal, round, and reactive to light. Conjunctivae and lids are normal. No scleral icterus.   Pt with room spinning on evaluation of EOM, unable to complete secondary to discomfort   Neck: Trachea normal, full passive range of motion without pain and phonation normal. Neck supple. No neck rigidity. No edema and no erythema present.   Cardiovascular: Normal rate, regular rhythm, normal heart sounds, intact distal pulses and normal pulses.   Pulmonary/Chest: Effort normal and breath sounds normal. No respiratory distress. She has no decreased breath sounds. She has no rhonchi.   Good breath sounds throughout all lung fields. No wheezes, rales, or rhonchi.    No coughing on exam   Abdominal: Normal appearance.   Musculoskeletal: Normal range of motion. She exhibits no edema or deformity.   Neurological: She is alert and oriented to person, place, and time. She has normal strength. She is not disoriented. No cranial nerve deficit or sensory deficit. She exhibits normal muscle tone. She displays a negative Romberg sign. Coordination and gait normal. GCS eye subscore is 4. GCS verbal subscore is 5. GCS motor subscore is 6.   Romberg nml  Gait nml  Speech nml  No facial drooping  CN intact  Sensation intact to bilat face, chest, UE, LE  Alert and oriented to person, place, situation, time   Skin: Skin is warm, dry, intact, not diaphoretic and not pale.   Psychiatric: She has a normal mood and affect. Her speech is normal and behavior is normal. Judgment and thought content normal. Cognition and memory are normal.   Nursing note and vitals reviewed.    Results for orders placed or performed in visit on 02/17/20   POCT Influenza A/B   Result Value Ref Range    Rapid Influenza A Ag Negative Negative    Rapid Influenza B Ag Negative Negative     Acceptable Yes      Vitals:    02/17/20 1917   BP: (!) 123/94   Pulse: 64   Resp: 19   Temp: 99.1 °F (37.3 °C)   TempSrc: Oral   SpO2: 100%   Weight:  "89.8 kg (198 lb)   Height: 5' 1" (1.549 m)           Assessment:       1. Left otitis media, unspecified otitis media type    2. Viral syndrome    3. Body aches    4. Vertigo        Plan:       Diarrhea onset yesterday (now resolved) with cough, congestion, sore throat, left ear pain. Subjective fever at home, vertigo, generalized weakness and fatigue. Flu neg. Lungs Clear. Vitals stable.   Likely viral syndrome with left OM on exam. Pt with hx of recurrent and frequent vertigo, could be exacerbated by OM at this time.     Recommend augmentin, meclizine, and f/u with pcp first available this week to eval further for resolution. I also discussed with patient that if she feels symptoms worsen at all or if she has new, concerning, changing, worsening symptoms or if she feels presyncopal then she should be seen in the ED immediately. Discussed at length. All questions answered.    Labs reviewed, pertinent imaging reviewed, previous medical records, medical history, surgical history, social history, family history reviewed.       Left otitis media, unspecified otitis media type  -     amoxicillin-clavulanate 875-125mg (AUGMENTIN) 875-125 mg per tablet; Take 1 tablet by mouth 2 (two) times daily. for 10 days  Dispense: 20 tablet; Refill: 0    Viral syndrome    Body aches  -     POCT Influenza A/B    Vertigo  -     meclizine tablet 25 mg  -     meclizine (ANTIVERT) 25 mg tablet; Take 1 tablet (25 mg total) by mouth 3 (three) times daily as needed for Dizziness.  Dispense: 20 tablet; Refill: 0         Patient Instructions   Fluids  Rest  Meclizine as needed for vertigo  augmentin     Monitor temperatures    Please go to the emergency department for any changes, worsening, new or concerning symptoms, chest pain or shortness of breath    Please return here or go to the Emergency Department for any concerns or worsening of condition.  If you were prescribed antibiotics, please take them to completion.  If you were prescribed a " narcotic medication, do not drive or operate heavy equipment or machinery while taking these medications.  Please follow up with your primary care doctor or specialist as needed.    If you  smoke, please stop smoking.        Vertigo (Unknown Cause)    In addition to helping with hearing, the inner ear is part of the balance center of your body. Problems with the inner ear can a false feeling of motion. This is called vertigo. Often, it feels as if you or the room is spinning. A vertigo attack may cause sudden nausea, vomiting and heavy sweating. Severe vertigo causes a loss of balance and can cause you to fall. During vertigo, small head movements and changes in body position will often make the symptoms worse. You may also have ringing in the ears called tinnitus.  An episode of vertigo may last seconds, minutes or hours. Once you are over the first episode, it may never come back. However, symptoms may return off and on.  The cause of your vertigo is not yet known. Possible causes of vertigo include:  · Inflammation of the inner ear  · Disease of the nerves to the inner ear  · Movement of calcium particles in the inner ear  · Poor blood flow to the balance centers of the brain  · Migraine headaches  Home care  · If symptoms are severe, rest quietly in bed. Change positions very slowly. There is usually one position that will feel best, such as lying on one side or lying on your back with your head slightly raised on pillows.  · Do not drive a car or work with dangerous machinery until symptoms have been gone for at least one week.  · Take medicine as prescribed to relieve your symptoms. Unless another medicine was prescribed for symptoms of nausea, vomiting, and dizziness, you may use over-the-counter motion sickness pills. Ask your pharmacist for suggestions.  Follow-up care  Follow up with your healthcare provider or as directed. If you are referred to a specialist or for testing, make the appointment  promptly.  When to seek medical advice  Call your healthcare provider if any of the following occur:  · Fever of 100.4°F (38ºC) or higher, or as directed by your healthcare provider  · Vertigo worsens or is not controlled by prescribed medicine   · Repeated vomiting not relieved by prescribed medicine   · Severe headache  · Confusion  · Weakness of an arm or leg or one side of the face  · Difficulty with speech or vision  · Loss of consciousness   · Seizure  Date Last Reviewed: 8/16/2015 © 2000-2017 [employer]. [address] [place] [address]. All rights reserved. This information is not intended as a substitute for professional medical care. Always follow your healthcare professional's instructions.        Middle Ear Infection (Adult)  You have an infection of the middle ear, the space behind the eardrum. This is also called acute otitis media (AOM). Sometimes it is caused by the common cold. This is because congestion can block the internal passage (eustachian tube) that drains fluid from the middle ear. When the middle ear fills with fluid, bacteria can grow there and cause an infection. Oral antibiotics are used to treat this illness, not ear drops. Symptoms usually start to improve within 1 to 2 days of treatment.    Home care  The following are general care guidelines:  · Finish all of the antibiotic medicine given, even though you may feel better after the first few days.  · You may use over-the-counter medicine, such as acetaminophen or ibuprofen, to control pain and fever, unless something else was prescribed. If you have chronic liver or kidney disease or have ever had a stomach ulcer or gastrointestinal bleeding, talk with your healthcare provider before using these medicines. Do not give aspirin to anyone under 18 years of age who has a fever. It may cause severe illness or death.  Follow-up care  Follow up with your healthcare provider, or as advised, in 2 weeks if all symptoms  have not gotten better, or if hearing doesn't go back to normal within 1 month.  When to seek medical advice  Call your healthcare provider right away if any of these occur:  · Ear pain gets worse or does not improve after 3 days of treatment  · Unusual drowsiness or confusion  · Neck pain, stiff neck, or headache  · Fluid or blood draining from the ear canal  · Fever of 100.4°F (38°C) or as advised   · Seizure  Date Last Reviewed: 6/1/2016  © 7517-4474 [employer]. [address] [place] [address], [place] [address]. All rights reserved. This information is not intended as a substitute for professional medical care. Always follow your healthcare professional's instructions.

## 2020-02-20 ENCOUNTER — TELEPHONE (OUTPATIENT)
Dept: NEUROLOGY | Facility: CLINIC | Age: 60
End: 2020-02-20

## 2020-02-20 NOTE — TELEPHONE ENCOUNTER
----- Message from Michael Zamarripa sent at 2/20/2020  3:09 PM CST -----  Contact: [facility]/Zachary Petty-nurse  Please call Zachary at [phone]    Patient is currently in-house at [facility] in CCU bed D since yesterday     Thank you

## 2020-02-26 DIAGNOSIS — G43.009 MIGRAINE WITHOUT AURA AND WITHOUT STATUS MIGRAINOSUS, NOT INTRACTABLE: ICD-10-CM

## 2020-02-26 RX ORDER — NORTRIPTYLINE HYDROCHLORIDE 50 MG/1
50 CAPSULE ORAL NIGHTLY
Qty: 90 CAPSULE | Refills: 3 | Status: CANCELLED | OUTPATIENT
Start: 2020-02-26 | End: 2021-02-25

## 2020-02-26 RX ORDER — NORTRIPTYLINE HYDROCHLORIDE 50 MG/1
50 CAPSULE ORAL NIGHTLY
Qty: 90 CAPSULE | Refills: 3 | Status: SHIPPED | OUTPATIENT
Start: 2020-02-26 | End: 2021-03-01 | Stop reason: SDUPTHER

## 2020-02-26 NOTE — TELEPHONE ENCOUNTER
Nortriptyline 50 mg q.h.s. x1 year    Thelma De MD PhD  Neurology-Epilepsy  [facility].  [facility]      ----- Message from Chip Conde MA sent at 2/26/2020  9:37 AM CST -----  Contact: YIN SHER [[id number]]      ----- Message -----  From: Agapito Finney  Sent: 2/26/2020   8:49 AM CST  To: Santino Renee Staff        Can the clinic reply in [facility]: no      Please refill the medication(s) listed below. Please call the patient when the prescription(s) is ready for  at this phone number   [phone]        Medication #1 nortriptyline (PAMELOR) 50 MG capsule    Preferred Pharmacy: [facility], [facility] [id number] [address]

## 2020-08-18 ENCOUNTER — OFFICE VISIT (OUTPATIENT)
Dept: URGENT CARE | Facility: CLINIC | Age: 60
End: 2020-08-18
Payer: COMMERCIAL

## 2020-08-18 VITALS
BODY MASS INDEX: 37.38 KG/M2 | TEMPERATURE: 97 F | OXYGEN SATURATION: 96 % | HEART RATE: 86 BPM | WEIGHT: 198 LBS | HEIGHT: 61 IN | DIASTOLIC BLOOD PRESSURE: 70 MMHG | RESPIRATION RATE: 16 BRPM | SYSTOLIC BLOOD PRESSURE: 120 MMHG

## 2020-08-18 DIAGNOSIS — H92.02 OTALGIA, LEFT: ICD-10-CM

## 2020-08-18 DIAGNOSIS — J01.90 ACUTE NON-RECURRENT SINUSITIS, UNSPECIFIED LOCATION: ICD-10-CM

## 2020-08-18 DIAGNOSIS — R05.9 COUGH: Primary | ICD-10-CM

## 2020-08-18 DIAGNOSIS — J40 BRONCHITIS: ICD-10-CM

## 2020-08-18 PROCEDURE — 99214 PR OFFICE/OUTPT VISIT, EST, LEVL IV, 30-39 MIN: ICD-10-PCS | Mod: S$GLB,,, | Performed by: FAMILY MEDICINE

## 2020-08-18 PROCEDURE — 99214 OFFICE O/P EST MOD 30 MIN: CPT | Mod: S$GLB,,, | Performed by: FAMILY MEDICINE

## 2020-08-18 PROCEDURE — U0003 INFECTIOUS AGENT DETECTION BY NUCLEIC ACID (DNA OR RNA); SEVERE ACUTE RESPIRATORY SYNDROME CORONAVIRUS 2 (SARS-COV-2) (CORONAVIRUS DISEASE [COVID-19]), AMPLIFIED PROBE TECHNIQUE, MAKING USE OF HIGH THROUGHPUT TECHNOLOGIES AS DESCRIBED BY CMS-2020-01-R: HCPCS

## 2020-08-18 RX ORDER — AMOXICILLIN AND CLAVULANATE POTASSIUM 875; 125 MG/1; MG/1
1 TABLET, FILM COATED ORAL 2 TIMES DAILY
Qty: 14 TABLET | Refills: 0 | Status: SHIPPED | OUTPATIENT
Start: 2020-08-18 | End: 2020-08-25

## 2020-08-18 RX ORDER — VALSARTAN 80 MG/1
TABLET ORAL
COMMUNITY
Start: 2020-05-30

## 2020-08-18 RX ORDER — BISOPROLOL FUMARATE 5 MG/1
TABLET, FILM COATED ORAL
COMMUNITY
Start: 2020-07-17

## 2020-08-18 NOTE — PATIENT INSTRUCTIONS
PLEASE READ YOUR DISCHARGE INSTRUCTIONS ENTIRELY AS IT CONTAINS IMPORTANT INFORMATION.    Please return here or go to the Emergency Department for any concerns or worsening of condition.    If you were prescribed antibiotics, please take them to completion.      If not allergic, please take over the counter Tylenol (Acetaminophen) and/or Motrin (Ibuprofen) as directed for control of pain and/or fever.  Please follow up with your primary care doctor or specialist as needed.    If you smoke, please stop smoking.    Please return or see your primary care doctor if you develop new or worsening symptoms.     Please arrange follow up with your primary medical clinic as soon as possible. You must understand that you've received an Urgent Care treatment only and that you may be released before all of your medical problems are known or treated. You, the patient, will arrange for follow up as instructed. If your symptoms worsen or fail to improve you should go to the Emergency Room.  Bronchitis, Antibiotic Treatment (Adult)    Bronchitis is an infection of the air passages (bronchial tubes) in your lungs. It often occurs when you have a cold. This illness is contagious during the first few days and is spread through the air by coughing and sneezing, or by direct contact (touching the sick person and then touching your own eyes, nose, or mouth).  Symptoms of bronchitis include cough with mucus (phlegm) and low-grade fever. Bronchitis usually lasts 7 to 14 days. Mild cases can be treated with simple home remedies. More severe infection is treated with an antibiotic.  Home care  Follow these guidelines when caring for yourself at home:  · If your symptoms are severe, rest at home for the first 2 to 3 days. When you go back to your usual activities, don't let yourself get too tired.  · Do not smoke. Also avoid being exposed to secondhand smoke.  · You may use over-the-counter medicines to control fever or pain, unless another  medicine was prescribed. (Note: If you have chronic liver or kidney disease or have ever had a stomach ulcer or gastrointestinal bleeding, talk with your healthcare provider before using these medicines. Also talk to your provider if you are taking medicine to prevent blood clots.) Aspirin should never be given to anyone younger than 18 years of age who is ill with a viral infection or fever. It may cause severe liver or brain damage.  · Your appetite may be poor, so a light diet is fine. Avoid dehydration by drinking 6 to 8 glasses of fluids per day (such as water, soft drinks, sports drinks, juices, tea, or soup). Extra fluids will help loosen secretions in the nose and lungs.  · Over-the-counter cough, cold, and sore-throat medicines will not shorten the length of the illness, but they may be helpful to reduce symptoms. (Note: Do not use decongestants if you have high blood pressure.)  · Finish all antibiotic medicine. Do this even if you are feeling better after only a few days.  Follow-up care  Follow up with your healthcare provider, or as advised. If you had an X-ray or ECG (electrocardiogram), a specialist will review it. You will be notified of any new findings that may affect your care.  Note: If you are age 65 or older, or if you have a chronic lung disease or condition that affects your immune system, or you smoke, talk to your healthcare provider about having pneumococcal vaccinations and a yearly influenza vaccination (flu shot).  When to seek medical advice  Call your healthcare provider right away if any of these occur:  · Fever of 100.4°F (38°C) or higher  · Coughing up increased amounts of colored sputum  · Weakness, drowsiness, headache, facial pain, ear pain, or a stiff neck  Call 911, or get immediate medical care  Contact emergency services right away if any of these occur.  · Coughing up blood  · Worsening weakness, drowsiness, headache, or stiff neck  · Trouble breathing, wheezing, or pain  with breathing  Date Last Reviewed: 9/13/2015  © 0132-8651 [employer]. [address] [place] [address], [place] [address]. All rights reserved. This information is not intended as a substitute for professional medical care. Always follow your healthcare professional's instructions.        Sinusitis (Antibiotic Treatment)    The sinuses are air-filled spaces within the bones of the face. They connect to the inside of the nose. Sinusitis is an inflammation of the tissue lining the sinus cavity. Sinus inflammation can occur during a cold. It can also be due to allergies to pollens and other particles in the air. Sinusitis can cause symptoms of sinus congestion and fullness. A sinus infection causes fever, headache and facial pain. There is often green or yellow drainage from the nose or into the back of the throat (post-nasal drip). You have been given antibiotics to treat this condition.  Home care:  · Take the full course of antibiotics as instructed. Do not stop taking them, even if you feel better.  · Drink plenty of water, hot tea, and other liquids. This may help thin mucus. It also may promote sinus drainage.  · Heat may help soothe painful areas of the face. Use a towel soaked in hot water. Or,  the shower and direct the hot spray onto your face. Using a vaporizer along with a menthol rub at night may also help.   · An expectorant containing guaifenesin may help thin the mucus and promote drainage from the sinuses.  · Over-the-counter decongestants may be used unless a similar medicine was prescribed. Nasal sprays work the fastest. Use one that contains phenylephrine or oxymetazoline. First blow the nose gently. Then use the spray. Do not use these medicines more often than directed on the label or symptoms may get worse. You may also use tablets containing pseudoephedrine. Avoid products that combine ingredients, because side effects may be increased. Read labels. You can also ask the pharmacist  for help. (NOTE: Persons with high blood pressure should not use decongestants. They can raise blood pressure.)  · Over-the-counter antihistamines may help if allergies contributed to your sinusitis.    · Do not use nasal rinses or irrigation during an acute sinus infection, unless told to by your health care provider. Rinsing may spread the infection to other sinuses.  · Use acetaminophen or ibuprofen to control pain, unless another pain medicine was prescribed. (If you have chronic liver or kidney disease or ever had a stomach ulcer, talk with your doctor before using these medicines. Aspirin should never be used in anyone under 18 years of age who is ill with a fever. It may cause severe liver damage.)  · Don't smoke. This can worsen symptoms.  Follow-up care  Follow up with your healthcare provider or our staff if you are not improving within the next week.  When to seek medical advice  Call your healthcare provider if any of these occur:  · Facial pain or headache becoming more severe  · Stiff neck  · Unusual drowsiness or confusion  · Swelling of the forehead or eyelids  · Vision problems, including blurred or double vision  · Fever of 100.4ºF (38ºC) or higher, or as directed by your healthcare provider  · Seizure  · Breathing problems  · Symptoms not resolving within 10 days  Date Last Reviewed: 4/13/2015 © 2000-2017 [employer]. [address] [place] [address], [place] [address]. All rights reserved. This information is not intended as a substitute for professional medical care. Always follow your healthcare professional's instructions.      Guidelines for General Prevention of COVID-19    o Take steps to protect yourself from COVID-19. Perform hand hygiene frequently. Wash your hands often with soap and water for at least 20 seconds of use and alcohol-based hand , covering all surfaces of your hands and rubbing them together until they feel dry.  o Avoid touching your eyes, nose, and mouth  with unwashed hands.  o Avoid close contact with people and stay home if youre sick, except to get medical care.   o Cover coughs and sneezes with a tissue, or use the inside of your elbow. Immediately wash your hands or use hand .     For more information, see CDC link below:    https://www.cdc.gov/coronavirus/2019-ncov/hcp/guidance-prevent-spread.html#precautions

## 2020-08-18 NOTE — PROGRESS NOTES
"Subjective:       Patient ID: Zeina Estrella is a 60 y.o. female.    Vitals:  height is 5' 1" (1.549 m) and weight is 89.8 kg (198 lb). Her tympanic temperature is 97.3 °F (36.3 °C). Her blood pressure is 120/70 and her pulse is 86. Her respiration is 16 and oxygen saturation is 96%.     Chief Complaint: Sore Throat (Left side of throat) and Otalgia (Left Ear)    Patient reports cough, sinus/chest congestion, sore throat and left ear pain for last 3-4 days.  Denies fever, chills, shortness of, chest pain, abdominal pain, headache, nausea/vomiting, body aches, loss of smell or taste.  Patient is  and wants to be tested for COVID also.    Sore Throat   This is a new problem. Episode onset: 4 days. The problem has been gradually worsening. The pain is worse on the left side. There has been no fever. The pain is at a severity of 7/10. Associated symptoms include coughing, ear pain (left ear) and trouble swallowing (off and on). Pertinent negatives include no congestion, shortness of breath, stridor or vomiting. Treatments tried: antibotics advil. The treatment provided no relief.   Otalgia   There is pain in the left ear. This is a new problem. Episode onset: 4 days. The problem occurs constantly. The problem has been gradually worsening. There has been no fever. The pain is at a severity of 10/10. Associated symptoms include coughing and a sore throat (left side). Pertinent negatives include no rash or vomiting. She has tried antibiotics (advil) for the symptoms. The treatment provided no relief. There is no history of a chronic ear infection.       Constitution: Negative for chills, sweating, fatigue and fever.   HENT: Positive for ear pain (left ear), sore throat (left side) and trouble swallowing (off and on). Negative for congestion, postnasal drip, sinus pain, sinus pressure and voice change.    Neck: Negative for painful lymph nodes.   Eyes: Negative for eye redness.   Respiratory: Positive " for cough. Negative for chest tightness, sputum production, bloody sputum, COPD, shortness of breath, stridor, wheezing and asthma.    Gastrointestinal: Negative for nausea and vomiting.   Musculoskeletal: Negative for muscle ache.   Skin: Negative for rash.   Allergic/Immunologic: Negative for seasonal allergies and asthma.   Hematologic/Lymphatic: Negative for swollen lymph nodes.       Objective:      Physical Exam   Constitutional: She is oriented to person, place, and time. She appears well-developed. She is cooperative.  Non-toxic appearance. She does not appear ill. No distress.   HENT:   Head: Normocephalic and atraumatic.   Ears:   Right Ear: Hearing, tympanic membrane, external ear and ear canal normal. impacted cerumen  Left Ear: Hearing, tympanic membrane, external ear and ear canal normal. impacted cerumen  Nose: Congestion present. No mucosal edema, rhinorrhea or nasal deformity. No epistaxis. Right sinus exhibits no maxillary sinus tenderness and no frontal sinus tenderness. Left sinus exhibits no maxillary sinus tenderness and no frontal sinus tenderness.      Comments: Positive bilateral maxillary sinus tenderness.  Mouth/Throat: Uvula is midline, oropharynx is clear and moist and mucous membranes are normal. Mucous membranes are moist. No trismus in the jaw. Normal dentition. No uvula swelling. No oropharyngeal exudate or posterior oropharyngeal erythema.   Eyes: Pupils are equal, round, and reactive to light. Conjunctivae and lids are normal. Right eye exhibits no discharge. Left eye exhibits no discharge. No scleral icterus. extraocular movement intact  Neck: Trachea normal, normal range of motion, full passive range of motion without pain and phonation normal. Neck supple.   Cardiovascular: Normal rate, regular rhythm, normal heart sounds and normal pulses.   Pulmonary/Chest: Effort normal and breath sounds normal. No respiratory distress. She has no wheezes. She has no rhonchi. She has no  rales. She exhibits no tenderness.   Abdominal: Soft. Normal appearance and bowel sounds are normal. She exhibits no distension, no pulsatile midline mass and no mass. There is no abdominal tenderness.   Musculoskeletal: Normal range of motion.         General: No deformity.   Neurological: She is alert and oriented to person, place, and time. She exhibits normal muscle tone. Coordination normal.   Skin: Skin is warm, dry, intact, not diaphoretic and not pale. Psychiatric: Her speech is normal and behavior is normal. Judgment and thought content normal.   Nursing note and vitals reviewed.        Assessment:       1. Cough    2. Acute non-recurrent sinusitis, unspecified location    3. Bronchitis    4. Otalgia, left        Plan:         Cough  -     amoxicillin-clavulanate 875-125mg (AUGMENTIN) 875-125 mg per tablet; Take 1 tablet by mouth 2 (two) times daily. for 7 days  Dispense: 14 tablet; Refill: 0  -     COVID-19 Routine Screening    Acute non-recurrent sinusitis, unspecified location  -     amoxicillin-clavulanate 875-125mg (AUGMENTIN) 875-125 mg per tablet; Take 1 tablet by mouth 2 (two) times daily. for 7 days  Dispense: 14 tablet; Refill: 0    Bronchitis  -     amoxicillin-clavulanate 875-125mg (AUGMENTIN) 875-125 mg per tablet; Take 1 tablet by mouth 2 (two) times daily. for 7 days  Dispense: 14 tablet; Refill: 0    Otalgia, left  -     amoxicillin-clavulanate 875-125mg (AUGMENTIN) 875-125 mg per tablet; Take 1 tablet by mouth 2 (two) times daily. for 7 days  Dispense: 14 tablet; Refill: 0    PLEASE READ YOUR DISCHARGE INSTRUCTIONS ENTIRELY AS IT CONTAINS IMPORTANT INFORMATION.    Please return here or go to the Emergency Department for any concerns or worsening of condition.    If you were prescribed antibiotics, please take them to completion.      If not allergic, please take over the counter Tylenol (Acetaminophen) and/or Motrin (Ibuprofen) as directed for control of pain and/or fever.  Please follow up  with your primary care doctor or specialist as needed.    If you smoke, please stop smoking.    Please return or see your primary care doctor if you develop new or worsening symptoms.     Please arrange follow up with your primary medical clinic as soon as possible. You must understand that you've received an Urgent Care treatment only and that you may be released before all of your medical problems are known or treated. You, the patient, will arrange for follow up as instructed. If your symptoms worsen or fail to improve you should go to the Emergency Room.  Bronchitis, Antibiotic Treatment (Adult)    Bronchitis is an infection of the air passages (bronchial tubes) in your lungs. It often occurs when you have a cold. This illness is contagious during the first few days and is spread through the air by coughing and sneezing, or by direct contact (touching the sick person and then touching your own eyes, nose, or mouth).  Symptoms of bronchitis include cough with mucus (phlegm) and low-grade fever. Bronchitis usually lasts 7 to 14 days. Mild cases can be treated with simple home remedies. More severe infection is treated with an antibiotic.  Home care  Follow these guidelines when caring for yourself at home:  · If your symptoms are severe, rest at home for the first 2 to 3 days. When you go back to your usual activities, don't let yourself get too tired.  · Do not smoke. Also avoid being exposed to secondhand smoke.  · You may use over-the-counter medicines to control fever or pain, unless another medicine was prescribed. (Note: If you have chronic liver or kidney disease or have ever had a stomach ulcer or gastrointestinal bleeding, talk with your healthcare provider before using these medicines. Also talk to your provider if you are taking medicine to prevent blood clots.) Aspirin should never be given to anyone younger than 18 years of age who is ill with a viral infection or fever. It may cause severe liver or  brain damage.  · Your appetite may be poor, so a light diet is fine. Avoid dehydration by drinking 6 to 8 glasses of fluids per day (such as water, soft drinks, sports drinks, juices, tea, or soup). Extra fluids will help loosen secretions in the nose and lungs.  · Over-the-counter cough, cold, and sore-throat medicines will not shorten the length of the illness, but they may be helpful to reduce symptoms. (Note: Do not use decongestants if you have high blood pressure.)  · Finish all antibiotic medicine. Do this even if you are feeling better after only a few days.  Follow-up care  Follow up with your healthcare provider, or as advised. If you had an X-ray or ECG (electrocardiogram), a specialist will review it. You will be notified of any new findings that may affect your care.  Note: If you are age 65 or older, or if you have a chronic lung disease or condition that affects your immune system, or you smoke, talk to your healthcare provider about having pneumococcal vaccinations and a yearly influenza vaccination (flu shot).  When to seek medical advice  Call your healthcare provider right away if any of these occur:  · Fever of 100.4°F (38°C) or higher  · Coughing up increased amounts of colored sputum  · Weakness, drowsiness, headache, facial pain, ear pain, or a stiff neck  Call 911, or get immediate medical care  Contact emergency services right away if any of these occur.  · Coughing up blood  · Worsening weakness, drowsiness, headache, or stiff neck  · Trouble breathing, wheezing, or pain with breathing  Date Last Reviewed: 9/13/2015 © 2000-2017 [employer]. [address] [place] [address], [place] [address]. All rights reserved. This information is not intended as a substitute for professional medical care. Always follow your healthcare professional's instructions.        Sinusitis (Antibiotic Treatment)    The sinuses are air-filled spaces within the bones of the face. They connect to the inside of  the nose. Sinusitis is an inflammation of the tissue lining the sinus cavity. Sinus inflammation can occur during a cold. It can also be due to allergies to pollens and other particles in the air. Sinusitis can cause symptoms of sinus congestion and fullness. A sinus infection causes fever, headache and facial pain. There is often green or yellow drainage from the nose or into the back of the throat (post-nasal drip). You have been given antibiotics to treat this condition.  Home care:  · Take the full course of antibiotics as instructed. Do not stop taking them, even if you feel better.  · Drink plenty of water, hot tea, and other liquids. This may help thin mucus. It also may promote sinus drainage.  · Heat may help soothe painful areas of the face. Use a towel soaked in hot water. Or,  the shower and direct the hot spray onto your face. Using a vaporizer along with a menthol rub at night may also help.   · An expectorant containing guaifenesin may help thin the mucus and promote drainage from the sinuses.  · Over-the-counter decongestants may be used unless a similar medicine was prescribed. Nasal sprays work the fastest. Use one that contains phenylephrine or oxymetazoline. First blow the nose gently. Then use the spray. Do not use these medicines more often than directed on the label or symptoms may get worse. You may also use tablets containing pseudoephedrine. Avoid products that combine ingredients, because side effects may be increased. Read labels. You can also ask the pharmacist for help. (NOTE: Persons with high blood pressure should not use decongestants. They can raise blood pressure.)  · Over-the-counter antihistamines may help if allergies contributed to your sinusitis.    · Do not use nasal rinses or irrigation during an acute sinus infection, unless told to by your health care provider. Rinsing may spread the infection to other sinuses.  · Use acetaminophen or ibuprofen to control pain,  unless another pain medicine was prescribed. (If you have chronic liver or kidney disease or ever had a stomach ulcer, talk with your doctor before using these medicines. Aspirin should never be used in anyone under 18 years of age who is ill with a fever. It may cause severe liver damage.)  · Don't smoke. This can worsen symptoms.  Follow-up care  Follow up with your healthcare provider or our staff if you are not improving within the next week.  When to seek medical advice  Call your healthcare provider if any of these occur:  · Facial pain or headache becoming more severe  · Stiff neck  · Unusual drowsiness or confusion  · Swelling of the forehead or eyelids  · Vision problems, including blurred or double vision  · Fever of 100.4ºF (38ºC) or higher, or as directed by your healthcare provider  · Seizure  · Breathing problems  · Symptoms not resolving within 10 days  Date Last Reviewed: 4/13/2015  © 5581-8437 [employer]. [address] [place] [address]. All rights reserved. This information is not intended as a substitute for professional medical care. Always follow your healthcare professional's instructions.      Guidelines for General Prevention of COVID-19    o Take steps to protect yourself from COVID-19. Perform hand hygiene frequently. Wash your hands often with soap and water for at least 20 seconds of use and alcohol-based hand , covering all surfaces of your hands and rubbing them together until they feel dry.  o Avoid touching your eyes, nose, and mouth with unwashed hands.  o Avoid close contact with people and stay home if youre sick, except to get medical care.   o Cover coughs and sneezes with a tissue, or use the inside of your elbow. Immediately wash your hands or use hand .     For more information, see CDC link below:    https://www.cdc.gov/coronavirus/2019-ncov/hcp/guidance-prevent-spread.html#precautions

## 2020-08-20 ENCOUNTER — TELEPHONE (OUTPATIENT)
Dept: URGENT CARE | Facility: CLINIC | Age: 60
End: 2020-08-20

## 2020-08-20 LAB — SARS-COV-2 RNA RESP QL NAA+PROBE: NOT DETECTED

## 2020-08-20 NOTE — TELEPHONE ENCOUNTER
Called and informed about negative COVID test.  Patient is feeling much better.  All questions answered.

## 2020-11-28 ENCOUNTER — CLINICAL SUPPORT (OUTPATIENT)
Dept: URGENT CARE | Facility: CLINIC | Age: 60
End: 2020-11-28
Payer: COMMERCIAL

## 2020-11-28 VITALS — TEMPERATURE: 98 F

## 2020-11-28 DIAGNOSIS — Z13.9 ENCOUNTER FOR SCREENING: Primary | ICD-10-CM

## 2020-11-28 LAB
CTP QC/QA: YES
SARS-COV-2 RDRP RESP QL NAA+PROBE: NEGATIVE

## 2020-11-28 PROCEDURE — U0002 COVID-19 LAB TEST NON-CDC: HCPCS | Mod: QW,S$GLB,, | Performed by: FAMILY MEDICINE

## 2020-11-28 PROCEDURE — U0002: ICD-10-PCS | Mod: QW,S$GLB,, | Performed by: FAMILY MEDICINE

## 2020-12-11 ENCOUNTER — CLINICAL SUPPORT (OUTPATIENT)
Dept: URGENT CARE | Facility: CLINIC | Age: 60
End: 2020-12-11
Payer: COMMERCIAL

## 2020-12-11 DIAGNOSIS — Z20.822 EXPOSURE TO COVID-19 VIRUS: Primary | ICD-10-CM

## 2020-12-11 LAB
CTP QC/QA: YES
SARS-COV-2 RDRP RESP QL NAA+PROBE: NEGATIVE

## 2020-12-11 PROCEDURE — U0002: ICD-10-PCS | Mod: QW,S$GLB,, | Performed by: NURSE PRACTITIONER

## 2020-12-11 PROCEDURE — U0002 COVID-19 LAB TEST NON-CDC: HCPCS | Mod: QW,S$GLB,, | Performed by: NURSE PRACTITIONER

## 2021-01-05 ENCOUNTER — CLINICAL SUPPORT (OUTPATIENT)
Dept: URGENT CARE | Facility: CLINIC | Age: 61
End: 2021-01-05
Payer: COMMERCIAL

## 2021-01-05 DIAGNOSIS — Z20.822 EXPOSURE TO COVID-19 VIRUS: Primary | ICD-10-CM

## 2021-01-05 LAB
CTP QC/QA: YES
SARS-COV-2 RDRP RESP QL NAA+PROBE: NEGATIVE

## 2021-01-05 PROCEDURE — U0002: ICD-10-PCS | Mod: QW,S$GLB,, | Performed by: NURSE PRACTITIONER

## 2021-01-05 PROCEDURE — U0002 COVID-19 LAB TEST NON-CDC: HCPCS | Mod: QW,S$GLB,, | Performed by: NURSE PRACTITIONER

## 2021-03-01 ENCOUNTER — PATIENT MESSAGE (OUTPATIENT)
Dept: NEUROLOGY | Facility: CLINIC | Age: 61
End: 2021-03-01

## 2021-03-01 ENCOUNTER — PATIENT MESSAGE (OUTPATIENT)
Dept: NEUROLOGY | Facility: HOSPITAL | Age: 61
End: 2021-03-01

## 2021-03-01 DIAGNOSIS — G43.009 MIGRAINE WITHOUT AURA AND WITHOUT STATUS MIGRAINOSUS, NOT INTRACTABLE: ICD-10-CM

## 2021-03-01 RX ORDER — NORTRIPTYLINE HYDROCHLORIDE 50 MG/1
50 CAPSULE ORAL NIGHTLY
Qty: 90 CAPSULE | Refills: 3 | Status: SHIPPED | OUTPATIENT
Start: 2021-03-01 | End: 2022-01-14

## 2022-01-14 DIAGNOSIS — G43.009 MIGRAINE WITHOUT AURA AND WITHOUT STATUS MIGRAINOSUS, NOT INTRACTABLE: ICD-10-CM

## 2022-01-14 RX ORDER — NORTRIPTYLINE HYDROCHLORIDE 50 MG/1
CAPSULE ORAL
Qty: 90 CAPSULE | Refills: 3 | Status: SHIPPED | OUTPATIENT
Start: 2022-01-14 | End: 2023-03-28

## 2022-01-14 NOTE — TELEPHONE ENCOUNTER
Nortriptyline 50 mg nightly    Thelma De MD PhD  Neurology-Epilepsy  [facility].

## 2023-03-27 DIAGNOSIS — G43.009 MIGRAINE WITHOUT AURA AND WITHOUT STATUS MIGRAINOSUS, NOT INTRACTABLE: ICD-10-CM

## 2023-03-28 RX ORDER — NORTRIPTYLINE HYDROCHLORIDE 50 MG/1
CAPSULE ORAL
Qty: 90 CAPSULE | Refills: 3 | Status: SHIPPED | OUTPATIENT
Start: 2023-03-28

## 2023-03-28 NOTE — TELEPHONE ENCOUNTER
Nortriptyline 50 mg nightly    Thelma De MD PhD  Neurology-Epilepsy  [facility].              nortriptyline (PAMELOR) 50 MG capsule     Sig: TAKE ONE CAPSULE BY MOUTH EVERY EVENING    Disp:  90 capsule    Refills:  3    Start: 3/27/2023    Class: Normal    For: Migraine without aura and without status migrainosus, not intractable    Last ordered: 1 year ago by Thelma De MD PhD Last refill: 1/4/2023    Rx #: [id number]    Tricyclic Antidepressants Failed 03/27/2023 10:18 AM    Patient has a recent visit in past 12 months or next 90 days    Patient is not currently pregnant    No positive pregnancy test in past 12 months       To be filled at: [facility] ([place], [place]) [place], [place] - [id number] [place] [address] [facility] Byrnes